# Patient Record
Sex: FEMALE | Race: BLACK OR AFRICAN AMERICAN | NOT HISPANIC OR LATINO | Employment: FULL TIME | URBAN - METROPOLITAN AREA
[De-identification: names, ages, dates, MRNs, and addresses within clinical notes are randomized per-mention and may not be internally consistent; named-entity substitution may affect disease eponyms.]

---
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
## 2019-01-03 ENCOUNTER — HOSPITAL ENCOUNTER (EMERGENCY)
Facility: HOSPITAL | Age: 19
Discharge: HOME | End: 2019-01-03
Attending: EMERGENCY MEDICINE
Payer: MEDICAID

## 2019-01-03 VITALS
DIASTOLIC BLOOD PRESSURE: 64 MMHG | TEMPERATURE: 98 F | SYSTOLIC BLOOD PRESSURE: 102 MMHG | OXYGEN SATURATION: 100 % | WEIGHT: 136 LBS | BODY MASS INDEX: 24.1 KG/M2 | HEART RATE: 99 BPM | HEIGHT: 63 IN | RESPIRATION RATE: 16 BRPM

## 2019-01-03 DIAGNOSIS — R07.9 CHEST PAIN: ICD-10-CM

## 2019-01-03 DIAGNOSIS — R51.9 NONINTRACTABLE HEADACHE, UNSPECIFIED CHRONICITY PATTERN, UNSPECIFIED HEADACHE TYPE: Primary | ICD-10-CM

## 2019-01-03 LAB
ALBUMIN SERPL BCP-MCNC: 4.7 G/DL
ALP SERPL-CCNC: 74 U/L
ALT SERPL W/O P-5'-P-CCNC: 13 U/L
ANION GAP SERPL CALC-SCNC: 10 MMOL/L
AST SERPL-CCNC: 19 U/L
BASOPHILS # BLD AUTO: 0.01 K/UL
BASOPHILS NFR BLD: 0.2 %
BILIRUB SERPL-MCNC: 1 MG/DL
BILIRUB UR QL STRIP: NEGATIVE
BUN SERPL-MCNC: 6 MG/DL
CALCIUM SERPL-MCNC: 10.2 MG/DL
CHLORIDE SERPL-SCNC: 107 MMOL/L
CLARITY UR: CLEAR
CO2 SERPL-SCNC: 24 MMOL/L
COLOR UR: YELLOW
CREAT SERPL-MCNC: 0.8 MG/DL
DIFFERENTIAL METHOD: ABNORMAL
EOSINOPHIL # BLD AUTO: 0.1 K/UL
EOSINOPHIL NFR BLD: 2.6 %
ERYTHROCYTE [DISTWIDTH] IN BLOOD BY AUTOMATED COUNT: 12.2 %
EST. GFR  (AFRICAN AMERICAN): >60 ML/MIN/1.73 M^2
EST. GFR  (NON AFRICAN AMERICAN): >60 ML/MIN/1.73 M^2
GLUCOSE SERPL-MCNC: 83 MG/DL
GLUCOSE UR QL STRIP: NEGATIVE
HCT VFR BLD AUTO: 39.9 %
HGB BLD-MCNC: 13.5 G/DL
HGB UR QL STRIP: NEGATIVE
KETONES UR QL STRIP: NEGATIVE
LEUKOCYTE ESTERASE UR QL STRIP: NEGATIVE
LYMPHOCYTES # BLD AUTO: 1.8 K/UL
LYMPHOCYTES NFR BLD: 41.7 %
MCH RBC QN AUTO: 29.2 PG
MCHC RBC AUTO-ENTMCNC: 33.8 G/DL
MCV RBC AUTO: 86 FL
MONOCYTES # BLD AUTO: 0.4 K/UL
MONOCYTES NFR BLD: 9.8 %
NEUTROPHILS # BLD AUTO: 2 K/UL
NEUTROPHILS NFR BLD: 45.7 %
NITRITE UR QL STRIP: NEGATIVE
PH UR STRIP: 6 [PH] (ref 5–8)
PLATELET # BLD AUTO: 351 K/UL
PMV BLD AUTO: 9.3 FL
POTASSIUM SERPL-SCNC: 3.7 MMOL/L
PROT SERPL-MCNC: 9.1 G/DL
PROT UR QL STRIP: NEGATIVE
RBC # BLD AUTO: 4.62 M/UL
SODIUM SERPL-SCNC: 141 MMOL/L
SP GR UR STRIP: <=1.005 (ref 1–1.03)
URN SPEC COLLECT METH UR: ABNORMAL
UROBILINOGEN UR STRIP-ACNC: NEGATIVE EU/DL
WBC # BLD AUTO: 4.29 K/UL

## 2019-01-03 PROCEDURE — 81003 URINALYSIS AUTO W/O SCOPE: CPT

## 2019-01-03 PROCEDURE — 80053 COMPREHEN METABOLIC PANEL: CPT

## 2019-01-03 PROCEDURE — 93010 ELECTROCARDIOGRAM REPORT: CPT | Mod: ,,, | Performed by: INTERNAL MEDICINE

## 2019-01-03 PROCEDURE — 63600175 PHARM REV CODE 636 W HCPCS: Performed by: EMERGENCY MEDICINE

## 2019-01-03 PROCEDURE — 99284 EMERGENCY DEPT VISIT MOD MDM: CPT

## 2019-01-03 PROCEDURE — 93005 ELECTROCARDIOGRAM TRACING: CPT

## 2019-01-03 PROCEDURE — 85025 COMPLETE CBC W/AUTO DIFF WBC: CPT

## 2019-01-03 PROCEDURE — 96374 THER/PROPH/DIAG INJ IV PUSH: CPT

## 2019-01-03 PROCEDURE — 93010 EKG 12-LEAD: ICD-10-PCS | Mod: ,,, | Performed by: INTERNAL MEDICINE

## 2019-01-03 RX ORDER — GUANFACINE 1 MG/1
1 TABLET ORAL NIGHTLY
COMMUNITY
End: 2024-01-31

## 2019-01-03 RX ORDER — IBUPROFEN 600 MG/1
600 TABLET ORAL EVERY 6 HOURS PRN
Qty: 20 TABLET | Refills: 0 | OUTPATIENT
Start: 2019-01-03 | End: 2019-08-17

## 2019-01-03 RX ORDER — FERROUS SULFATE 324(65)MG
325 TABLET, DELAYED RELEASE (ENTERIC COATED) ORAL DAILY
COMMUNITY
End: 2024-01-31

## 2019-01-03 RX ORDER — FLUTICASONE PROPIONATE 50 MCG
1 SPRAY, SUSPENSION (ML) NASAL DAILY
COMMUNITY
End: 2019-08-17

## 2019-01-03 RX ORDER — METOCLOPRAMIDE HYDROCHLORIDE 5 MG/ML
10 INJECTION INTRAMUSCULAR; INTRAVENOUS
Status: COMPLETED | OUTPATIENT
Start: 2019-01-03 | End: 2019-01-03

## 2019-01-03 RX ADMIN — METOCLOPRAMIDE 10 MG: 5 INJECTION, SOLUTION INTRAMUSCULAR; INTRAVENOUS at 09:01

## 2019-01-04 NOTE — ED PROVIDER NOTES
"Encounter Date: 1/3/2019    SCRIBE #1 NOTE: I, Sergio Kay, am scribing for, and in the presence of,  Dr. Rony Kelley. I have scribed the entire note.       History     Chief Complaint   Patient presents with    Headache     18y F ambulatory to ED with c/o headache and dizziness x3 days with intermittent nausea     Time seen by provider: 8:11 PM    This is a 18 y.o. female who presents with complaint of headache x 2 weeks, worse the past 3 days. She admits to chest pain, SOB, abd pain, blurry vision, general body aches, nausea, and subjective fever earlier this week but denies diarrhea, focal numbness/weakness, or dysuria. No history of prior similar episodes. Patient reports being seen by PCP 2 months ago for headache and had MRI performed at "[facility]". Patient was told she had "low blood pressure" but was not prescribed any medication. Patient's LMP ended 1 week ago. Patient reports history of anemia but has not had blood transfusion before.      The history is provided by the patient.     Review of patient's allergies indicates:   Allergen Reactions    Sulfa (sulfonamide antibiotics) Nausea And Vomiting     Past Medical History:   Diagnosis Date    ADHD     Asthma     Environmental allergies      History reviewed. No pertinent surgical history.  History reviewed. No pertinent family history.  Social History     Tobacco Use    Smoking status: Never Smoker    Smokeless tobacco: Never Used   Substance Use Topics    Alcohol use: No    Drug use: No     Review of Systems   Constitutional: Positive for fever (currently resolved).        + generalized body aches   Eyes: Positive for visual disturbance (blurry vision).   Respiratory: Positive for shortness of breath.    Cardiovascular: Positive for chest pain.   Gastrointestinal: Positive for abdominal pain and nausea. Negative for diarrhea.   Genitourinary: Negative for dysuria.   Neurological: Positive for headaches. Negative for weakness and numbness. "   All other systems reviewed and are negative.      Physical Exam     Initial Vitals [01/03/19 1940]   BP Pulse Resp Temp SpO2   102/64 99 16 97.7 °F (36.5 °C) 100 %      MAP       --         Physical Exam    Nursing note and vitals reviewed.  Constitutional: She appears well-developed and well-nourished. She is not diaphoretic. No distress.   HENT:   Head: Atraumatic.   Mouth/Throat: Oropharynx is clear and moist.   Eyes: EOM are normal. Pupils are equal, round, and reactive to light.   Neck: Normal range of motion. Neck supple.   Cardiovascular: Normal rate, regular rhythm, normal heart sounds and intact distal pulses.   Pulmonary/Chest: Breath sounds normal. No respiratory distress. She has no wheezes.   Abdominal: Soft. She exhibits no distension and no mass. There is no tenderness.   Musculoskeletal: Normal range of motion. She exhibits no edema.   Neurological: She is alert and oriented to person, place, and time. She has normal strength. No cranial nerve deficit or sensory deficit. GCS score is 15. GCS eye subscore is 4. GCS verbal subscore is 5. GCS motor subscore is 6.   Skin: Skin is warm and dry.         ED Course   Procedures  Labs Reviewed   CBC W/ AUTO DIFFERENTIAL - Abnormal; Notable for the following components:       Result Value    Platelets 351 (*)     All other components within normal limits   COMPREHENSIVE METABOLIC PANEL - Abnormal; Notable for the following components:    Total Protein 9.1 (*)     All other components within normal limits   URINALYSIS, REFLEX TO URINE CULTURE - Abnormal; Notable for the following components:    Specific Gravity, UA <=1.005 (*)     All other components within normal limits    Narrative:     Preferred Collection Type->Urine, Clean Catch   PREGNANCY TEST, URINE RAPID     EKG Readings: (Independently Interpreted)   EKG: Normal sinus rhythm. Rate: 84 bpm. Normal intervals. No ischemia.       Imaging Results    None          Medical Decision Making:   Initial  Assessment:   Patient has a headache and generalized body pain.  She appears well and has a normal exam.   Will give Reglan for her headache and obtain labs to exclude hypokalemia/anemia.  She reports recent MRI and I do not suspect SAH; no imaging currently indicated.  Clinical Tests:   Lab Tests: Ordered and Reviewed  Medical Tests: Ordered and Reviewed  ED Management:  10:45 PM   I reviewed her labs; they are unremarkable.  I will discharge her home for in clinic follow up.                      Clinical Impression:     1. Nonintractable headache, unspecified chronicity pattern, unspecified headache type    2. Chest pain            Disposition:   Disposition: Discharged  Condition: Stable       I, Dr. Adam Kelley, personally performed the services described in this documentation. All medical record entries made by the scribe were at my direction and in my presence.  I have reviewed the chart and agree that the record reflects my personal performance and is accurate and complete. Adam Kelley MD.                 Rony Kelley MD  01/04/19 6289

## 2019-01-04 NOTE — ED TRIAGE NOTES
"Patient reports headache x few months, has had MRI in past and states "they did not find anything." Also c/o decreased appetite and nausea with occasional vomiting. Pt states she has been dizzy today.  "

## 2019-01-29 ENCOUNTER — TELEPHONE (OUTPATIENT)
Dept: HEMATOLOGY/ONCOLOGY | Facility: CLINIC | Age: 19
End: 2019-01-29

## 2019-01-30 ENCOUNTER — OFFICE VISIT (OUTPATIENT)
Dept: HEMATOLOGY/ONCOLOGY | Facility: CLINIC | Age: 19
End: 2019-01-30
Payer: COMMERCIAL

## 2019-01-30 ENCOUNTER — LAB VISIT (OUTPATIENT)
Dept: LAB | Facility: HOSPITAL | Age: 19
End: 2019-01-30
Attending: INTERNAL MEDICINE
Payer: COMMERCIAL

## 2019-01-30 VITALS
HEART RATE: 72 BPM | TEMPERATURE: 98 F | SYSTOLIC BLOOD PRESSURE: 93 MMHG | DIASTOLIC BLOOD PRESSURE: 62 MMHG | RESPIRATION RATE: 18 BRPM | WEIGHT: 128.75 LBS | BODY MASS INDEX: 22.81 KG/M2

## 2019-01-30 DIAGNOSIS — Z86.2 HISTORY OF ANEMIA: ICD-10-CM

## 2019-01-30 DIAGNOSIS — D75.839 THROMBOCYTOSIS: ICD-10-CM

## 2019-01-30 DIAGNOSIS — I95.89 OTHER SPECIFIED HYPOTENSION: ICD-10-CM

## 2019-01-30 DIAGNOSIS — F90.9 ATTENTION DEFICIT HYPERACTIVITY DISORDER (ADHD), UNSPECIFIED ADHD TYPE: ICD-10-CM

## 2019-01-30 DIAGNOSIS — Z86.2 HISTORY OF ANEMIA: Primary | ICD-10-CM

## 2019-01-30 PROBLEM — I95.9 LOW BLOOD PRESSURE: Status: ACTIVE | Noted: 2019-01-30

## 2019-01-30 LAB
BASOPHILS # BLD AUTO: 0.01 K/UL
BASOPHILS NFR BLD: 0.3 %
DIFFERENTIAL METHOD: ABNORMAL
EOSINOPHIL # BLD AUTO: 0.1 K/UL
EOSINOPHIL NFR BLD: 2.1 %
ERYTHROCYTE [DISTWIDTH] IN BLOOD BY AUTOMATED COUNT: 12.3 %
FERRITIN SERPL-MCNC: 28 NG/ML
HCT VFR BLD AUTO: 38.5 %
HGB BLD-MCNC: 12.7 G/DL
IRON SERPL-MCNC: 85 UG/DL
LYMPHOCYTES # BLD AUTO: 1.6 K/UL
LYMPHOCYTES NFR BLD: 46.6 %
MCH RBC QN AUTO: 29.1 PG
MCHC RBC AUTO-ENTMCNC: 33 G/DL
MCV RBC AUTO: 88 FL
MONOCYTES # BLD AUTO: 0.4 K/UL
MONOCYTES NFR BLD: 11.8 %
NEUTROPHILS # BLD AUTO: 1.3 K/UL
NEUTROPHILS NFR BLD: 39.2 %
PLATELET # BLD AUTO: 336 K/UL
PMV BLD AUTO: 9.4 FL
RBC # BLD AUTO: 4.36 M/UL
SATURATED IRON: 20 %
TOTAL IRON BINDING CAPACITY: 426 UG/DL
TRANSFERRIN SERPL-MCNC: 288 MG/DL
WBC # BLD AUTO: 3.39 K/UL

## 2019-01-30 PROCEDURE — 36415 COLL VENOUS BLD VENIPUNCTURE: CPT

## 2019-01-30 PROCEDURE — 3008F BODY MASS INDEX DOCD: CPT | Mod: CPTII,S$GLB,, | Performed by: INTERNAL MEDICINE

## 2019-01-30 PROCEDURE — 85025 COMPLETE CBC W/AUTO DIFF WBC: CPT

## 2019-01-30 PROCEDURE — 83540 ASSAY OF IRON: CPT

## 2019-01-30 PROCEDURE — 3008F PR BODY MASS INDEX (BMI) DOCUMENTED: ICD-10-PCS | Mod: CPTII,S$GLB,, | Performed by: INTERNAL MEDICINE

## 2019-01-30 PROCEDURE — 82728 ASSAY OF FERRITIN: CPT

## 2019-01-30 PROCEDURE — 99204 PR OFFICE/OUTPT VISIT, NEW, LEVL IV, 45-59 MIN: ICD-10-PCS | Mod: S$GLB,,, | Performed by: INTERNAL MEDICINE

## 2019-01-30 PROCEDURE — 99999 PR PBB SHADOW E&M-EST. PATIENT-LVL III: CPT | Mod: PBBFAC,,, | Performed by: INTERNAL MEDICINE

## 2019-01-30 PROCEDURE — 99204 OFFICE O/P NEW MOD 45 MIN: CPT | Mod: S$GLB,,, | Performed by: INTERNAL MEDICINE

## 2019-01-30 PROCEDURE — 83020 HEMOGLOBIN ELECTROPHORESIS: CPT

## 2019-01-30 PROCEDURE — 99999 PR PBB SHADOW E&M-EST. PATIENT-LVL III: ICD-10-PCS | Mod: PBBFAC,,, | Performed by: INTERNAL MEDICINE

## 2019-01-30 NOTE — PROGRESS NOTES
"PATIENT: Jeremi Haney  MRN: [id number]  DATE: 1/30/2019    Diagnosis:   1. History of anemia    2. Thrombocytosis    3. Other specified hypotension    4. Attention deficit hyperactivity disorder (ADHD), unspecified ADHD type      Chief Complaint: history of anemia    Subjective:    History of Present Illness: Ms. Haney is a 18 y.o. female who presents for evaluation and management of a history of anemia and low blood anemia. She is self-referred.  - she has the following comorbid conditions: attention deficit hyperactivity disorder, a self-reported history of anemia and low-blood pressure. She believes she was told that she had iron deficiency. She takes an iron tablet twice daily. Regarding her low blood pressure, she states that she's had it for several years. She describes lightheadedness when she would change positions.    She states that her menstrual cycles are "not that heavy." They last 4-5 days, and maybe 1-2 days have moderate bleeding.     She presented to the emergency room on 1/3/19 with complaints of headache x 2 weeks. She had other symptoms, including "chest pain, SOB, abd pain, blurry vision, general body aches, nausea, and subjective fever earlier this week but denies diarrhea, focal numbness/weakness, or dysuria."     Today, she complains of headaches and lightheadedness upon changing positions. She denies shortness of breath, chest pain, nausea, vomiting, diarrhea, constipation.    Past medical, surgical, family, and social histories have been reviewed and updated below.    Past Medical History:   Past Medical History:   Diagnosis Date    ADHD     Asthma     Environmental allergies        Past Surgical History: No past surgical history on file.    Family History: No family history on file.    Social History:  reports that  has never smoked. she has never used smokeless tobacco. She reports that she does not drink alcohol or use drugs.    Allergies:  Review of patient's allergies indicates: "   Allergen Reactions    Sulfa (sulfonamide antibiotics) Nausea And Vomiting       Medications:  Current Outpatient Medications   Medication Sig Dispense Refill    CETIRIZINE HCL (ZYRTEC ORAL) Take by mouth.      ferrous sulfate 324 mg (65 mg iron) TbEC Take 325 mg by mouth once daily.      fluticasone (FLONASE) 50 mcg/actuation nasal spray 1 spray by Each Nare route once daily.      guanFACINE (TENEX) 1 MG Tab Take 1 mg by mouth every evening.      ibuprofen (ADVIL,MOTRIN) 600 MG tablet Take 1 tablet (600 mg total) by mouth every 6 (six) hours as needed for Pain. 20 tablet 0     No current facility-administered medications for this visit.        Review of Systems   Constitutional: Positive for fatigue.   HENT: Negative for sore throat.    Eyes: Negative for visual disturbance.   Respiratory: Negative for cough and shortness of breath.    Cardiovascular: Negative for chest pain.   Gastrointestinal: Negative for abdominal pain.   Genitourinary: Negative for dysuria.   Musculoskeletal: Negative for back pain.   Skin: Negative for rash.   Neurological: Positive for headaches.   Hematological: Negative for adenopathy.   Psychiatric/Behavioral: The patient is not nervous/anxious.      ECOG Performance Status:   ECOG SCORE 0     Objective:      Vitals:   Vitals:    01/30/19 1314   BP: 93/62   Pulse: 72   Resp: 18   Temp: 98.2 °F (36.8 °C)   TempSrc: Oral   Weight: 58.4 kg (128 lb 12 oz)     BMI: Body mass index is 22.81 kg/m².    Physical Exam   Constitutional: She is oriented to person, place, and time. She appears well-developed and well-nourished.   HENT:   Head: Normocephalic and atraumatic.   Eyes: EOM are normal. Pupils are equal, round, and reactive to light.   Neck: Normal range of motion. Neck supple.   Cardiovascular: Normal rate and regular rhythm.   Pulmonary/Chest: Effort normal and breath sounds normal. She has no rales.   Abdominal: Soft. Bowel sounds are normal.   Musculoskeletal: Normal range of  motion. She exhibits no edema.   Neurological: She is alert and oriented to person, place, and time.   Skin: Skin is warm and dry.   Psychiatric: She has a normal mood and affect. Her behavior is normal. Judgment and thought content normal.   Nursing note and vitals reviewed.    Laboratory Data:  Labs have been reviewed.    Lab Results   Component Value Date    WBC 3.39 (L) 01/30/2019    HGB 12.7 01/30/2019    HCT 38.5 01/30/2019    MCV 88 01/30/2019     01/30/2019     Assessment:       1. History of anemia    2. Thrombocytosis    3. Other specified hypotension    4. Attention deficit hyperactivity disorder (ADHD), unspecified ADHD type         Plan:     1. History of anemia / thrombocytosis  - lab from 1/3/19 revealed borderline thrombocytosis at 351 k/uL. She believes she has a history of iron deficiency anemia and possibly sickle cell trait.  - I have ordered the following labs: CBC, iron/TIBC, ferritin, hemoglobin electrophoresis  - platelet count today is normal at 336 k/uL.   - I will call her with the results of her iron studies and hemoglobin electrophoresis. If needed, I will schedule a follow-up appointment.    2. Hypotension / headaches / probable orthostatic hypotension  - I am concerned that one of the medications she takes for ADHD, guanfacine, is contributing to her symptoms. It is an alpha-2 adrenoreceptor agonist that decreases vasomotor tone and heart rate. This can lead to poor compensation by the vasculature when changing position.  - I asked her to speak to her primary care physician or possibly a psychiatrist about changing medications.    - I will call her with the results of her iron studies and hemoglobin electrophoresis. If needed, I will schedule a follow-up appointment.    Robert Jordan M.D.  Hematology/Oncology  [facility] [address], [address]  [place] [address]  Phone: [phone]  Fax: [phone]

## 2019-02-04 ENCOUNTER — TELEPHONE (OUTPATIENT)
Dept: HEMATOLOGY/ONCOLOGY | Facility: CLINIC | Age: 19
End: 2019-02-04

## 2019-02-04 LAB
HGB A MFR BLD ELPH: 97.4 % (ref 95.8–98)
HGB A2 MFR BLD: 2.6 % (ref 2–3.3)
HGB F MFR BLD: 0 % (ref 0–0.9)
HGB FRACT BLD ELPH-IMP: NORMAL
HGB OTHER MFR BLD ELPH: 0 %
THEVP VARIANT 2: NORMAL
THEVP VARIANT 3: NORMAL

## 2019-02-06 ENCOUNTER — TELEPHONE (OUTPATIENT)
Dept: HEMATOLOGY/ONCOLOGY | Facility: CLINIC | Age: 19
End: 2019-02-06

## 2019-02-06 NOTE — TELEPHONE ENCOUNTER
I called and gave Ms. Haney the results of her labs. Her hemoglobin electrophoresis was negative for sickle cell trait. She does not have a hemoglobinopathy such as sickle cell disease or beta thalassemia. Her platelet count was normal. Her iron studies were normal but are low normal, suggestive of borderline iron deficiency. I recommended that she continue taking oral iron.    Patient is in agreement with the proposed treatment plan. All questions were answered to her satisfaction.    Robert Jordan M.D.  Hematology/Oncology  [facility] [address], [address]  [place] [address]  Phone: [phone]  Fax: [phone]

## 2019-08-17 ENCOUNTER — HOSPITAL ENCOUNTER (EMERGENCY)
Facility: HOSPITAL | Age: 19
Discharge: HOME | End: 2019-08-17
Attending: EMERGENCY MEDICINE
Payer: COMMERCIAL

## 2019-08-17 VITALS
RESPIRATION RATE: 20 BRPM | BODY MASS INDEX: 23.03 KG/M2 | OXYGEN SATURATION: 98 % | SYSTOLIC BLOOD PRESSURE: 109 MMHG | HEART RATE: 112 BPM | TEMPERATURE: 100 F | DIASTOLIC BLOOD PRESSURE: 61 MMHG | WEIGHT: 130 LBS

## 2019-08-17 DIAGNOSIS — S29.019A STRAIN OF MUSCLE OF TORSO, INITIAL ENCOUNTER: ICD-10-CM

## 2019-08-17 DIAGNOSIS — J30.2 SEASONAL ALLERGIC RHINITIS, UNSPECIFIED TRIGGER: ICD-10-CM

## 2019-08-17 DIAGNOSIS — N30.01 ACUTE CYSTITIS WITH HEMATURIA: Primary | ICD-10-CM

## 2019-08-17 LAB
B-HCG UR QL: NEGATIVE
BACTERIA #/AREA URNS HPF: ABNORMAL /HPF
BILIRUB UR QL STRIP: NEGATIVE
CLARITY UR: ABNORMAL
COLOR UR: YELLOW
CTP QC/QA: YES
GLUCOSE UR QL STRIP: NEGATIVE
HGB UR QL STRIP: ABNORMAL
KETONES UR QL STRIP: ABNORMAL
LEUKOCYTE ESTERASE UR QL STRIP: ABNORMAL
MICROSCOPIC COMMENT: ABNORMAL
NITRITE UR QL STRIP: POSITIVE
NON-SQ EPI CELLS #/AREA URNS HPF: 2 /HPF
PH UR STRIP: 6 [PH] (ref 5–8)
PROT UR QL STRIP: ABNORMAL
RBC #/AREA URNS HPF: 2 /HPF (ref 0–4)
SP GR UR STRIP: 1.02 (ref 1–1.03)
SQUAMOUS #/AREA URNS HPF: 2 /HPF
URN SPEC COLLECT METH UR: ABNORMAL
UROBILINOGEN UR STRIP-ACNC: NEGATIVE EU/DL
WBC #/AREA URNS HPF: 40 /HPF (ref 0–5)

## 2019-08-17 PROCEDURE — 81000 URINALYSIS NONAUTO W/SCOPE: CPT

## 2019-08-17 PROCEDURE — 87186 SC STD MICRODIL/AGAR DIL: CPT

## 2019-08-17 PROCEDURE — 87077 CULTURE AEROBIC IDENTIFY: CPT

## 2019-08-17 PROCEDURE — 87088 URINE BACTERIA CULTURE: CPT

## 2019-08-17 PROCEDURE — 87086 URINE CULTURE/COLONY COUNT: CPT

## 2019-08-17 PROCEDURE — 81025 URINE PREGNANCY TEST: CPT | Performed by: NURSE PRACTITIONER

## 2019-08-17 PROCEDURE — 25000003 PHARM REV CODE 250: Performed by: NURSE PRACTITIONER

## 2019-08-17 PROCEDURE — 99284 EMERGENCY DEPT VISIT MOD MDM: CPT | Mod: 25

## 2019-08-17 PROCEDURE — 63600175 PHARM REV CODE 636 W HCPCS: Performed by: NURSE PRACTITIONER

## 2019-08-17 PROCEDURE — 96372 THER/PROPH/DIAG INJ SC/IM: CPT

## 2019-08-17 RX ORDER — IBUPROFEN 600 MG/1
600 TABLET ORAL EVERY 6 HOURS PRN
Qty: 20 TABLET | Refills: 0 | Status: SHIPPED | OUTPATIENT
Start: 2019-08-17 | End: 2019-08-22

## 2019-08-17 RX ORDER — PHENAZOPYRIDINE HYDROCHLORIDE 200 MG/1
200 TABLET, FILM COATED ORAL 3 TIMES DAILY
Qty: 15 TABLET | Refills: 0 | Status: SHIPPED | OUTPATIENT
Start: 2019-08-17 | End: 2019-08-22

## 2019-08-17 RX ORDER — CETIRIZINE HYDROCHLORIDE 10 MG/1
10 TABLET ORAL DAILY
Qty: 30 TABLET | Refills: 11 | Status: SHIPPED | OUTPATIENT
Start: 2019-08-17 | End: 2020-01-21 | Stop reason: CLARIF

## 2019-08-17 RX ORDER — METHOCARBAMOL 500 MG/1
500 TABLET, FILM COATED ORAL 3 TIMES DAILY PRN
Qty: 15 TABLET | Refills: 0 | Status: SHIPPED | OUTPATIENT
Start: 2019-08-17 | End: 2019-08-22

## 2019-08-17 RX ORDER — CEPHALEXIN 500 MG/1
500 CAPSULE ORAL EVERY 12 HOURS
Qty: 10 CAPSULE | Refills: 0 | Status: SHIPPED | OUTPATIENT
Start: 2019-08-17 | End: 2019-08-22

## 2019-08-17 RX ORDER — KETOROLAC TROMETHAMINE 30 MG/ML
15 INJECTION, SOLUTION INTRAMUSCULAR; INTRAVENOUS
Status: COMPLETED | OUTPATIENT
Start: 2019-08-17 | End: 2019-08-17

## 2019-08-17 RX ORDER — DIAZEPAM 10 MG/2ML
5 INJECTION INTRAMUSCULAR
Status: COMPLETED | OUTPATIENT
Start: 2019-08-17 | End: 2019-08-17

## 2019-08-17 RX ORDER — FLUTICASONE PROPIONATE 50 MCG
1 SPRAY, SUSPENSION (ML) NASAL 2 TIMES DAILY PRN
Qty: 15 G | Refills: 0 | Status: SHIPPED | OUTPATIENT
Start: 2019-08-17 | End: 2020-01-21 | Stop reason: CLARIF

## 2019-08-17 RX ORDER — CEPHALEXIN 500 MG/1
500 CAPSULE ORAL
Status: COMPLETED | OUTPATIENT
Start: 2019-08-17 | End: 2019-08-17

## 2019-08-17 RX ADMIN — DIAZEPAM 5 MG: 5 INJECTION, SOLUTION INTRAMUSCULAR; INTRAVENOUS at 11:08

## 2019-08-17 RX ADMIN — KETOROLAC TROMETHAMINE 15 MG: 30 INJECTION, SOLUTION INTRAMUSCULAR at 11:08

## 2019-08-17 RX ADMIN — CEPHALEXIN 500 MG: 500 CAPSULE ORAL at 11:08

## 2019-08-17 NOTE — ED PROVIDER NOTES
Encounter Date: 8/17/2019       History     Chief Complaint   Patient presents with    Back Pain     reports lower back pain that radiates to upper back that radiates upward. also c/ of right leg pain that started thrusday as well. denies injury. last dose of ibuprofen was last night at 2300. denies numbness/ tingling.     Nasal Congestion     reports nasal congestion since thursday     Pt is a 20 yo female with medical history of asthma, ADHD, allergies presenting to the ED for back pain and nasal congestion.  Pt states that she saw a chiropractor last week who completed x-rays.  Patient states she was told she had muscle strain of her right side.  Pt states she has not taken anything for the pain.  Pt also states she has nasal congestion and sinus pressure.  Pt states she is out of her Flonase and Zyrtec.  Pt denies any trauma or falls.  Pt denies any chest pain, SOB, dysuria.        The history is provided by the patient and a relative.     Review of patient's allergies indicates:   Allergen Reactions    Sulfa (sulfonamide antibiotics) Nausea And Vomiting     Past Medical History:   Diagnosis Date    ADHD     Asthma     Environmental allergies      No past surgical history on file.  No family history on file.  Social History     Tobacco Use    Smoking status: Never Smoker    Smokeless tobacco: Never Used   Substance Use Topics    Alcohol use: No    Drug use: No     Review of Systems   Constitutional: Negative for activity change, appetite change, chills, fatigue and fever.   HENT: Positive for congestion, postnasal drip, sinus pressure and sinus pain. Negative for facial swelling, sore throat and trouble swallowing.    Respiratory: Negative for cough, chest tightness, shortness of breath and wheezing.    Cardiovascular: Negative for chest pain and palpitations.   Gastrointestinal: Negative for abdominal pain, constipation, diarrhea, nausea and vomiting.   Genitourinary: Positive for flank pain ( right).  Negative for decreased urine volume, difficulty urinating, dysuria, hematuria, pelvic pain, urgency, vaginal bleeding and vaginal discharge.   Musculoskeletal: Positive for back pain. Negative for arthralgias, joint swelling and myalgias.   Skin: Negative for color change, pallor, rash and wound.   Neurological: Negative for dizziness, syncope, weakness, numbness and headaches.   Hematological: Does not bruise/bleed easily.   All other systems reviewed and are negative.      Physical Exam     Initial Vitals [08/17/19 1051]   BP Pulse Resp Temp SpO2   (!) 100/59 (!) 137 20 (!) 100.4 °F (38 °C) 98 %      MAP       --         Physical Exam    Nursing note and vitals reviewed.  Constitutional: Vital signs are normal. She appears well-developed and well-nourished. She is cooperative. She does not have a sickly appearance. She does not appear ill. She appears distressed ( due to pain ).   HENT:   Head: Normocephalic and atraumatic.   Nose: Rhinorrhea present. No sinus tenderness. Right sinus exhibits maxillary sinus tenderness and frontal sinus tenderness. Left sinus exhibits maxillary sinus tenderness and frontal sinus tenderness.   Mouth/Throat: Uvula is midline, oropharynx is clear and moist and mucous membranes are normal.   Eyes: EOM and lids are normal. Pupils are equal, round, and reactive to light.   Neck: Trachea normal, normal range of motion, full passive range of motion without pain and phonation normal. Neck supple.   Cardiovascular: Normal rate, regular rhythm and intact distal pulses.   Pulses:       Radial pulses are 2+ on the right side, and 2+ on the left side.        Dorsalis pedis pulses are 2+ on the left side.   Pulmonary/Chest: Effort normal and breath sounds normal. She has no wheezes. She has no rhonchi.   Abdominal: Soft. Normal appearance and bowel sounds are normal. She exhibits no distension. There is no tenderness. There is no rigidity, no rebound and no guarding.   Musculoskeletal:         Lumbar back: She exhibits tenderness, pain and spasm. She exhibits normal range of motion, no bony tenderness, no swelling, no edema, no deformity, no laceration and normal pulse.        Back:    No C, T, L spine TTP.  No decreased sensation or strength to extremities.     Lymphadenopathy:     She has no cervical adenopathy.   Neurological: She is alert and oriented to person, place, and time. She has normal strength. No sensory deficit. She displays a negative Romberg sign. GCS eye subscore is 4. GCS verbal subscore is 5. GCS motor subscore is 6.   Skin: Skin is warm, dry and intact. Capillary refill takes 2 to 3 seconds. No abrasion, no laceration and no rash noted. No pallor.   Psychiatric: She has a normal mood and affect.         ED Course   Procedures  Labs Reviewed   URINALYSIS, REFLEX TO URINE CULTURE - Abnormal; Notable for the following components:       Result Value    Appearance, UA Cloudy (*)     Protein, UA Trace (*)     Ketones, UA 3+ (*)     Occult Blood UA Trace (*)     Nitrite, UA Positive (*)     Leukocytes, UA 2+ (*)     All other components within normal limits    Narrative:     Preferred Collection Type->Urine, Clean Catch   URINALYSIS MICROSCOPIC - Abnormal; Notable for the following components:    WBC, UA 40 (*)     Bacteria Many (*)     Non-Squam Epith 2 (*)     All other components within normal limits    Narrative:     Preferred Collection Type->Urine, Clean Catch   CULTURE, URINE   POCT URINE PREGNANCY          Imaging Results    None          Medical Decision Making:   Initial Assessment:   Emergent evaluation of a 18 yo female patient presenting to the ER with chief complaint of right sided low back pain and nasal congestion for the past week.  Patient states she was seen by a chiropractor on Thursday and had imaging completed.  Patient states she has had continued pain since that time.  Patient denies taking anything for her pain. Patient also states she is out of her Flonase and  Zyrtec.  On exam patient is A&O x3.  Not febrile and nontoxic appearing.  Tenderness to palpation over frontal and maxillary sinus.  Rhinorrhea noted. Oropharynx pink and moist.  Tenderness to palpation over right-sided internal oblique.  Increased pain with movement.  Abdomen soft and nontender.  Bowel sounds within normal limits. Patient denies any vaginal bleeding or vaginal discharge.      Differential Diagnosis:   Differential diagnoses include but are not limited to lumbar strain/sprain, renal colic, pyelonephritis, early shingles or muscle spasm/strain.    Clinical Tests:   Lab Tests: Ordered and Reviewed  The following lab test(s) were unremarkable: UPT and Urinalysis       <> Summary of Lab: Urine preg negative. UA shows trace blood with positive nitrite and 2+ leukocytes.  Will treat for UTI.  ED Management:  I will get urine, medicate and reassess.  Reviewed pt X-ray from Thursday.  No acute fractures or dislocations noted.  No acute abnormalities seen.      Patient reassessed.  Patient updated on UTI.  Advised patient we will prescribe her muscle relaxer for muscle strain, antibiotics for UTI, peridium for pain and Flonase/Zyrtec for seasonal allergies.  Patient advised to follow up with her PCP if symptoms are not resolving in the next 7-10 days.  Patient verbalized understanding of this plan of care.  All questions and concerns addressed.    Patient is hemodynamically stable, vital signs are normal. Discharge instructions given. Return to ED precautions discussed. Follow up as directed. Pt verbalized understanding of this plan.  Pt is stable for discharge.                         Clinical Impression:       ICD-10-CM ICD-9-CM   1. Acute cystitis with hematuria N30.01 595.0   2. Strain of muscle of torso, initial encounter S29.019A 848.8   3. Seasonal allergic rhinitis, unspecified trigger J30.2 477.9         Disposition:   Disposition: Discharged  Condition: Stable                        Matilda SALVADOR  [place], [place]  08/17/19 1153

## 2019-08-17 NOTE — ED NOTES
Pt here c/o right sided back pain that radiates upward-has seen chiropractor and gotten xrays. Right leg has involvement as well. Pt is AAOx4, resp are regular and unlabored. Denies urinary or bowel changes. Mother at side. Denies abd pain or discomfort.

## 2019-08-17 NOTE — DISCHARGE INSTRUCTIONS
We have prescribed you allergy medication for your allergic rhinitis.  Antibiotics and pain medication for your UTI.  We have also prescribed you a muscle relaxer for your muscle strain.  Please follow-up with your PCP if your symptoms do not start improving in the next 5-7 days.    Our goal in the emergency department is to always give you outstanding care and exceptional service. You may receive a survey by mail or e-mail in the next week regarding your experience in our ED. We would greatly appreciate your completing and returning the survey. Your feedback provides us with a way to recognize our staff who give very good care and it helps us learn how to improve when your experience was below our aspiration of excellence.

## 2019-08-19 LAB — BACTERIA UR CULT: ABNORMAL

## 2020-01-21 ENCOUNTER — HOSPITAL ENCOUNTER (EMERGENCY)
Facility: HOSPITAL | Age: 20
Discharge: HOME | End: 2020-01-21
Attending: EMERGENCY MEDICINE
Payer: COMMERCIAL

## 2020-01-21 VITALS
DIASTOLIC BLOOD PRESSURE: 67 MMHG | BODY MASS INDEX: 22.5 KG/M2 | HEIGHT: 63 IN | RESPIRATION RATE: 16 BRPM | TEMPERATURE: 99 F | WEIGHT: 127 LBS | SYSTOLIC BLOOD PRESSURE: 129 MMHG | HEART RATE: 99 BPM | OXYGEN SATURATION: 98 %

## 2020-01-21 DIAGNOSIS — J06.9 VIRAL URI WITH COUGH: Primary | ICD-10-CM

## 2020-01-21 LAB
B-HCG UR QL: NEGATIVE
CTP QC/QA: YES
DEPRECATED S PYO AG THROAT QL EIA: NEGATIVE
INFLUENZA A, MOLECULAR: NEGATIVE
INFLUENZA B, MOLECULAR: NEGATIVE
SPECIMEN SOURCE: NORMAL

## 2020-01-21 PROCEDURE — 87880 STREP A ASSAY W/OPTIC: CPT

## 2020-01-21 PROCEDURE — 25000003 PHARM REV CODE 250: Performed by: PHYSICIAN ASSISTANT

## 2020-01-21 PROCEDURE — 87081 CULTURE SCREEN ONLY: CPT

## 2020-01-21 PROCEDURE — 87502 INFLUENZA DNA AMP PROBE: CPT

## 2020-01-21 PROCEDURE — 81025 URINE PREGNANCY TEST: CPT | Performed by: PHYSICIAN ASSISTANT

## 2020-01-21 PROCEDURE — 99284 EMERGENCY DEPT VISIT MOD MDM: CPT

## 2020-01-21 RX ORDER — GUAIFENESIN AND DEXTROMETHORPHAN HYDROBROMIDE 1200; 60 MG/1; MG/1
1 TABLET, EXTENDED RELEASE ORAL 2 TIMES DAILY PRN
Qty: 30 TABLET | Refills: 0 | Status: SHIPPED | OUTPATIENT
Start: 2020-01-21 | End: 2020-01-31

## 2020-01-21 RX ORDER — IBUPROFEN 600 MG/1
600 TABLET ORAL EVERY 6 HOURS PRN
Qty: 20 TABLET | Refills: 0 | Status: SHIPPED | OUTPATIENT
Start: 2020-01-21 | End: 2024-01-31

## 2020-01-21 RX ORDER — CETIRIZINE HYDROCHLORIDE 10 MG/1
10 TABLET ORAL DAILY
Qty: 30 TABLET | Refills: 0 | Status: SHIPPED | OUTPATIENT
Start: 2020-01-21 | End: 2024-01-31

## 2020-01-21 RX ORDER — FLUTICASONE PROPIONATE 50 MCG
1 SPRAY, SUSPENSION (ML) NASAL 2 TIMES DAILY PRN
Qty: 15 G | Refills: 0 | Status: SHIPPED | OUTPATIENT
Start: 2020-01-21 | End: 2024-01-31

## 2020-01-21 RX ORDER — IBUPROFEN 600 MG/1
600 TABLET ORAL
Status: COMPLETED | OUTPATIENT
Start: 2020-01-21 | End: 2020-01-21

## 2020-01-21 RX ADMIN — IBUPROFEN 600 MG: 600 TABLET, FILM COATED ORAL at 08:01

## 2020-01-21 NOTE — ED PROVIDER NOTES
Encounter Date: 1/21/2020       History     Chief Complaint   Patient presents with    Cough     pt began having ear pain, sore throat, and cough on Sunday. 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 afebrile female with PMH of allergies, ADHD and asthma presented to the ED with c/o URI symptoms for the past 3 days.  She complains of bilateral ear pain, sore throat, congestion and cough.  She states cough is dry. She states symptoms have been constant since onset.  She states that she did try congestion medicine yesterday with no significant improvement.  She has not tried any medications today.  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  Patient denies any fever, chest pain, shortness of breath, nausea, vomiting, diarrhea or rash. She states that she is typically well.    The history is provided by the patient.     Review of patient's allergies indicates:   Allergen Reactions    Sulfa (sulfonamide antibiotics) Nausea And Vomiting     Past Medical History:   Diagnosis Date    ADHD     Asthma     Environmental allergies      History reviewed. No pertinent surgical history.  History reviewed. No pertinent family history.  Social History     Tobacco Use    Smoking status: Never Smoker    Smokeless tobacco: Never Used   Substance Use Topics    Alcohol use: No    Drug use: No     Review of Systems   Constitutional: Positive for chills and fever (Subjective).   HENT: Positive for congestion, postnasal drip, rhinorrhea and sore throat.    Respiratory: Positive for cough. Negative for shortness of breath.    Cardiovascular: Negative for chest pain.   Gastrointestinal: Negative for nausea and vomiting.   Musculoskeletal: Positive for arthralgias. Negative for back pain, myalgias and neck pain.   Skin: Negative for rash.   Neurological: Negative for dizziness, weakness, light-headedness and headaches.    Hematological: Does not bruise/bleed easily.       Physical Exam     Initial Vitals [01/21/20 0749]   BP Pulse Resp Temp SpO2   129/67 99 16 98.5 °F (36.9 °C) 98 %      MAP       --         Physical Exam    Nursing note and vitals reviewed.  Constitutional: Vital signs are normal. She appears well-developed and well-nourished. She is cooperative.  Non-toxic appearance. She does not appear ill. No distress.   HENT:   Head: Normocephalic and atraumatic.   Nose: Mucosal edema present.   Mouth/Throat: Mucous membranes are not dry. Posterior oropharyngeal erythema present. No oropharyngeal exudate or posterior oropharyngeal edema.   Eyes: Conjunctivae and lids are normal.   Neck: Neck supple. No neck rigidity.   Cardiovascular: Normal rate and regular rhythm.   Pulmonary/Chest: Breath sounds normal. No respiratory distress. She has no wheezes. She has no rhonchi.   Abdominal: Soft. Normal appearance. There is no tenderness. There is no rigidity.   Musculoskeletal: Normal range of motion.   Neurological: She is alert and oriented to person, place, and time. GCS eye subscore is 4. GCS verbal subscore is 5. GCS motor subscore is 6.   Skin: Skin is warm, dry and intact. No rash noted.   Psychiatric: She has a normal mood and affect. Her speech is normal and behavior is normal. Thought content normal.         ED Course   Procedures  Labs Reviewed   INFLUENZA A & B BY MOLECULAR   THROAT SCREEN, RAPID   CULTURE, STREP A,  THROAT   POCT URINE PREGNANCY          Imaging Results    None     Jeremi Haney 19 y.o. afebrile female with PMH of allergies, ADHD and asthma presented to the ED with c/o URI symptoms for the past 3 days.  She complains of bilateral ear pain, sore throat, congestion and cough.  She states cough is dry. She states symptoms have been constant since onset.  She states that she did try congestion medicine yesterday with no significant improvement.  She has not tried any medications today.  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  Patient denies any fever, chest pain, shortness of breath, nausea, vomiting, diarrhea or rash. She states that she is typically well. ROS positive for URI symptoms.  Physical exam reveals patient that appears ill but nontoxic. TM's with bilateral serous otitis media; nose with congestion and rhinorrhea. Oropharynx with mild erythema; no edema or exudate. Lungs clear and free of wheeze. Heart regular rate and rhythm. Abdomen is soft and nontender with normal bowel sounds. FROM of neck, no lymphadenopathy and FROM of all extremities with strength 5/5 bilaterally. Skin free of rash, pallor and diaphoresis.    DDX: influenza, viral URI with cough, pharyngitis     ED management: Flu and rapid strep swab negative. No further labs or imaging warranted at this time as patient remains well appearing, afebrile and in no distress at this time.  We will send home with supportive medications for probable viral URI in this otherwise well patient and informed patient that this process is typically self limiting. Instructed patient on fever and symptom control.      Impression/Plan: The encounter diagnosis was Viral URI with cough. Discharged with Zyrtec, Mucinex DM, Flonase and Motrin. Patient will follow up with Primary.  Patient cautioned on when to return to ED.  Pt. Understands and agrees with current treatment plan                                                            Clinical Impression:       ICD-10-CM ICD-9-CM   1. Viral URI with cough J06.9 465.9    B97.89                              ABY Fuchs  01/21/20 1038

## 2020-01-21 NOTE — ED NOTES
Ariana at bedside for assessment. Patient stated that she has generalized body aches, runny nose, cough and congestion x2 days. 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 Patient stated that her throat is sore, no complaints of swallowing difficulty. Clear drainage noted from bilateral nares. Swabs collected     APPEARANCE: Alert, oriented and in no acute distress.  CARDIAC: Normal rate and rhythm, no murmur heard.   PERIPHERAL VASCULAR: peripheral pulses present. Normal cap refill. No edema. Warm to touch.    RESPIRATORY:Normal rate and effort, breath sounds clear bilaterally throughout chest. Respirations are equal and unlabored no obvious signs of distress.  GASTRO: soft, bowel sounds normal, no tenderness, no abdominal distention.  MUSC: Full ROM. No bony tenderness or soft tissue tenderness. No obvious deformity.  SKIN: Skin is warm and dry, normal skin turgor, mucous membranes moist.  NEURO: 5/5 strength major flexors/extensors bilaterally. Sensory intact to light touch bilaterally. Lucretia coma scale: eyes open spontaneously-4, oriented & converses-5, obeys commands-6. No neurological abnormalities.   MENTAL STATUS: awake, alert and aware of environment.

## 2020-01-23 LAB — BACTERIA THROAT CULT: NORMAL

## 2020-07-11 ENCOUNTER — HOSPITAL ENCOUNTER (EMERGENCY)
Facility: HOSPITAL | Age: 20
Discharge: HOME | End: 2020-07-11
Attending: EMERGENCY MEDICINE
Payer: COMMERCIAL

## 2020-07-11 ENCOUNTER — NURSE TRIAGE (OUTPATIENT)
Dept: ADMINISTRATIVE | Facility: CLINIC | Age: 20
End: 2020-07-11

## 2020-07-11 VITALS
SYSTOLIC BLOOD PRESSURE: 98 MMHG | HEART RATE: 100 BPM | BODY MASS INDEX: 22.15 KG/M2 | HEIGHT: 63 IN | TEMPERATURE: 99 F | WEIGHT: 125 LBS | OXYGEN SATURATION: 98 % | DIASTOLIC BLOOD PRESSURE: 65 MMHG | RESPIRATION RATE: 18 BRPM

## 2020-07-11 DIAGNOSIS — Z20.822 SUSPECTED COVID-19 VIRUS INFECTION: Primary | ICD-10-CM

## 2020-07-11 LAB — GROUP A STREP, MOLECULAR: NEGATIVE

## 2020-07-11 PROCEDURE — 99284 EMERGENCY DEPT VISIT MOD MDM: CPT

## 2020-07-11 PROCEDURE — U0003 INFECTIOUS AGENT DETECTION BY NUCLEIC ACID (DNA OR RNA); SEVERE ACUTE RESPIRATORY SYNDROME CORONAVIRUS 2 (SARS-COV-2) (CORONAVIRUS DISEASE [COVID-19]), AMPLIFIED PROBE TECHNIQUE, MAKING USE OF HIGH THROUGHPUT TECHNOLOGIES AS DESCRIBED BY CMS-2020-01-R: HCPCS

## 2020-07-11 PROCEDURE — 87651 STREP A DNA AMP PROBE: CPT

## 2020-07-11 RX ORDER — BENZONATATE 100 MG/1
100 CAPSULE ORAL 3 TIMES DAILY PRN
Qty: 30 CAPSULE | Refills: 0 | Status: SHIPPED | OUTPATIENT
Start: 2020-07-11 | End: 2020-07-21

## 2020-07-11 RX ORDER — ALBUTEROL SULFATE 90 UG/1
1-2 AEROSOL, METERED RESPIRATORY (INHALATION) EVERY 6 HOURS PRN
Qty: 6.7 G | Refills: 0 | Status: SHIPPED | OUTPATIENT
Start: 2020-07-11 | End: 2024-01-31

## 2020-07-11 RX ORDER — ACETAMINOPHEN 500 MG/1
1000 CAPSULE, LIQUID FILLED ORAL EVERY 8 HOURS PRN
Qty: 30 CAPSULE | Refills: 0 | Status: SHIPPED | OUTPATIENT
Start: 2020-07-11 | End: 2024-01-31

## 2020-07-11 RX ORDER — GUAIFENESIN 100 MG/5ML
100-200 SOLUTION ORAL EVERY 4 HOURS PRN
Qty: 60 ML | Refills: 0 | Status: SHIPPED | OUTPATIENT
Start: 2020-07-11 | End: 2020-07-21

## 2020-07-11 NOTE — Clinical Note
"Jeremi "Jonny Haney was seen and treated in our emergency department on 7/11/2020.     COVID-19 is present in our communities across the state. There is limited testing for COVID at this time, so not all patients can be tested. In this situation, your employee meets the following criteria:    Jeremi Haney has met the criteria for COVID-19 testing based upon symptoms, travel, and/or potential exposure. The test has been completed and is pending results at this time. During this time the employee is not able to work and should be quarantined per the Centers for Disease Control timelines.     If you have any questions or concerns, or if I can be of further assistance, please do not hesitate to contact me.    Sincerely,             Mica Rodriguez NP"

## 2020-07-11 NOTE — DISCHARGE INSTRUCTIONS
Thank you for allowing me to care for you today.  I hope our treatment plan will make you feel better in the next few days.  In order for me to take better care of my future patients and improve our Emergency Department, I would appreciate if you can provide us with feedback.  In the next few days, you may receive a survey in the mail.  If you do, it would mean a great deal to me if you would please take the time to complete it.    Thank you and I hope you feel better.  Mica Rodriguez NP

## 2020-07-11 NOTE — TELEPHONE ENCOUNTER
Reason for Disposition   General information question, no triage required and triager able to answer question    Protocols used: INFORMATION ONLY CALL-A-AH    Pt requesting information to COVID testing locations. Pt requesting information to COVID testing locations. Pt advised all [facility] can test for COVID, but the Provider at the site determines if a test will be administered. Pt also advised of the [facility] testing sites. Advised to call  back if sob or difficulty breathing develops. Pt verbalized understanding.

## 2020-07-12 NOTE — ED PROVIDER NOTES
Encounter Date: 7/11/2020       History     Chief Complaint   Patient presents with    COVID-19 Concerns     c/o headache x2 days and sore throat today. denies known contact with anyone with covid-19     The patient is a 19-year-old female with a past medical history of ADHD, asthma, and environmental allergies who presents to the ED complaining of headache for the past 2 days and sore throat that began today.  No fevers.  No known coronavirus contacts.  She is tolerating oral intake.  No change in elimination patterns.  No treatment attempted prior to arrival.    The history is provided by the patient.     Review of patient's allergies indicates:   Allergen Reactions    Sulfa (sulfonamide antibiotics) Nausea And Vomiting     Past Medical History:   Diagnosis Date    ADHD     Asthma     Environmental allergies      No past surgical history on file.  No family history on file.  Social History     Tobacco Use    Smoking status: Never Smoker    Smokeless tobacco: Never Used   Substance Use Topics    Alcohol use: No    Drug use: No     Review of Systems   Constitutional: Negative for chills and fever.   HENT: Positive for sore throat.    Eyes: Negative for visual disturbance.   Respiratory: Negative for shortness of breath.    Cardiovascular: Negative for chest pain.   Gastrointestinal: Negative for nausea.   Genitourinary: Negative for dysuria.   Musculoskeletal: Negative for back pain.   Skin: Negative for rash.   Neurological: Positive for headaches. Negative for weakness.   Hematological: Does not bruise/bleed easily.       Physical Exam     Initial Vitals [07/11/20 1814]   BP Pulse Resp Temp SpO2   98/65 100 18 98.8 °F (37.1 °C) 98 %      MAP       --         Physical Exam    Nursing note and vitals reviewed.  Constitutional: She appears well-developed and well-nourished.  Non-toxic appearance. No distress.   Limited physical exam due to use of telemedicine technology in light of COVID-19 pandemic  The  patient is alert, oriented, and nontoxic appearing.  No apparent distress.  Respirations are even and unlabored.  Speaking in complete sentences.   HENT:   Head: Normocephalic and atraumatic.   Right Ear: Hearing normal.   Left Ear: Hearing and abnormal external ear normal.   Nose: Nose abnormal.   Eyes: Conjunctivae and EOM are normal.   Neck: Full passive range of motion without pain. Neck supple.   Cardiovascular: Normal rate.   Pulmonary/Chest: Effort normal. No respiratory distress.   Musculoskeletal: Normal range of motion.   Neurological: She is alert and oriented to person, place, and time. She has normal strength. Gait normal. GCS eye subscore is 4. GCS verbal subscore is 5. GCS motor subscore is 6.   Skin: Skin is warm, dry and intact. Capillary refill takes less than 2 seconds. No rash noted.   Psychiatric: She has a normal mood and affect. Her speech is normal and behavior is normal. Judgment and thought content normal. Cognition and memory are normal.         ED Course   Procedures  Labs Reviewed   GROUP A STREP, MOLECULAR   SARS-COV-2 (COVID-19) QUALITATIVE PCR          Imaging Results    None          Medical Decision Making:   History:   Old Medical Records: I decided to obtain old medical records.  Clinical Tests:   Lab Tests: Ordered  ED Management:  This is an emergent evaluation of a patient who presents to the emergency department for further evaluation of potential COVID symptoms.  Patient requests COVID test.  Limited physical exam conducted via telemedicine technology is not significantly concerning.  Patient's vital signs do not suggest sepsis.  No hypoxia or evidence of impending respiratory failure.  Patient's respirations are even and unlabored.  Patient reports tolerating oral intake.  Low suspicion for significant dehydration.  I believe that they are stable for discharge with outpatient follow-up as warranted.      COVID test is pending.      Patient will be discharged with medications  for symptomatic relief.  Patient was instructed on strategies for infectious disease prevention.  All questions answered.  Strict return precautions have been discussed.                                     Clinical Impression:       ICD-10-CM ICD-9-CM   1. Suspected Covid-19 Virus Infection  R68.89              ED Disposition Condition    Discharge Stable        ED Prescriptions     Medication Sig Dispense Start Date End Date Auth. Provider    albuterol (PROVENTIL/VENTOLIN HFA) 90 mcg/actuation inhaler Inhale 1-2 puffs into the lungs every 6 (six) hours as needed for Wheezing or Shortness of Breath. Rescue 6.7 g 7/11/2020 7/11/2021 Mica Rodriguez NP    guaifenesin 100 mg/5 ml (ROBITUSSIN) 100 mg/5 mL syrup Take 5-10 mLs (100-200 mg total) by mouth every 4 (four) hours as needed for Cough. 60 mL 7/11/2020 7/21/2020 Mica Rodriguez NP    benzonatate (TESSALON) 100 MG capsule Take 1 capsule (100 mg total) by mouth 3 (three) times daily as needed. 30 capsule 7/11/2020 7/21/2020 Mica Rodriguez NP    acetaminophen (TYLENOL) 500 mg Cap Take 2 capsules (1,000 mg total) by mouth every 8 (eight) hours as needed (pain/fever). 30 capsule 7/11/2020  Mica Rodriguez NP        Follow-up Information     Follow up With Specialties Details Why Contact Info    Meaghan Connors NP Family Medicine Schedule an appointment as soon as possible for a visit in 3 days  [address]  [address]  [place] [address]  [phone]      [facility]  If symptoms worsen [address] [address] [phone]                                     Mica Rodriguez NP  07/11/20 7996

## 2020-07-12 NOTE — ED NOTES
Pt c/o headache x2 days and sore throat today. Denies known contact with anyone with covid-19. Throat is red, but no swelling or exudate noted.     APPEARANCE: Alert, oriented and in no acute distress.  HEENT: Speaks without hoarseness.  CARDIAC: Normal rate and rhythm.    PERIPHERAL VASCULAR: peripheral pulses present. Normal cap refill. No edema. Warm to touch.    RESPIRATORY:Normal rate and effort. Respirations are equal and unlabored no obvious signs of distress.  GASTRO: soft, nondistended, nontender. Denies nausea, vomiting, or diarrhea.  : voids spontaneously and without difficulty.   MUSC: Full ROM. No obvious deformity. Ambulatory with a steady gait  SKIN: Skin is warm and dry, without discoloration. Mucous membranes moist.  NEURO: Pt is awake, alert, aware of environment. No neurologic deficits noted.

## 2020-07-13 LAB — SARS-COV-2 RNA RESP QL NAA+PROBE: NOT DETECTED

## 2020-08-11 ENCOUNTER — HOSPITAL ENCOUNTER (EMERGENCY)
Facility: HOSPITAL | Age: 20
Discharge: HOME | End: 2020-08-11
Attending: EMERGENCY MEDICINE
Payer: COMMERCIAL

## 2020-08-11 VITALS
DIASTOLIC BLOOD PRESSURE: 58 MMHG | OXYGEN SATURATION: 100 % | SYSTOLIC BLOOD PRESSURE: 99 MMHG | WEIGHT: 135 LBS | TEMPERATURE: 98 F | BODY MASS INDEX: 23.05 KG/M2 | HEART RATE: 80 BPM | RESPIRATION RATE: 16 BRPM | HEIGHT: 64 IN

## 2020-08-11 DIAGNOSIS — N30.00 ACUTE CYSTITIS WITHOUT HEMATURIA: Primary | ICD-10-CM

## 2020-08-11 DIAGNOSIS — R53.83 FATIGUE, UNSPECIFIED TYPE: ICD-10-CM

## 2020-08-11 LAB
ANION GAP SERPL CALC-SCNC: 8 MMOL/L (ref 8–16)
B-HCG UR QL: NEGATIVE
BACTERIA #/AREA URNS HPF: ABNORMAL /HPF
BASOPHILS # BLD AUTO: 0.01 K/UL (ref 0–0.2)
BASOPHILS NFR BLD: 0.3 % (ref 0–1.9)
BILIRUB UR QL STRIP: NEGATIVE
BUN SERPL-MCNC: 6 MG/DL (ref 6–20)
CALCIUM SERPL-MCNC: 9.2 MG/DL (ref 8.7–10.5)
CHLORIDE SERPL-SCNC: 107 MMOL/L (ref 95–110)
CLARITY UR: ABNORMAL
CO2 SERPL-SCNC: 22 MMOL/L (ref 23–29)
COLOR UR: YELLOW
CREAT SERPL-MCNC: 0.8 MG/DL (ref 0.5–1.4)
CTP QC/QA: YES
DIFFERENTIAL METHOD: ABNORMAL
EOSINOPHIL # BLD AUTO: 0 K/UL (ref 0–0.5)
EOSINOPHIL NFR BLD: 1.3 % (ref 0–8)
ERYTHROCYTE [DISTWIDTH] IN BLOOD BY AUTOMATED COUNT: 12.8 % (ref 11.5–14.5)
EST. GFR  (AFRICAN AMERICAN): >60 ML/MIN/1.73 M^2
EST. GFR  (NON AFRICAN AMERICAN): >60 ML/MIN/1.73 M^2
GLUCOSE SERPL-MCNC: 83 MG/DL (ref 70–110)
GLUCOSE UR QL STRIP: NEGATIVE
HCT VFR BLD AUTO: 36.1 % (ref 37–48.5)
HGB BLD-MCNC: 12.1 G/DL (ref 12–16)
HGB UR QL STRIP: NEGATIVE
IMM GRANULOCYTES # BLD AUTO: 0 K/UL (ref 0–0.04)
IMM GRANULOCYTES NFR BLD AUTO: 0 % (ref 0–0.5)
KETONES UR QL STRIP: NEGATIVE
LEUKOCYTE ESTERASE UR QL STRIP: ABNORMAL
LYMPHOCYTES # BLD AUTO: 1.6 K/UL (ref 1–4.8)
LYMPHOCYTES NFR BLD: 51.7 % (ref 18–48)
MCH RBC QN AUTO: 28.9 PG (ref 27–31)
MCHC RBC AUTO-ENTMCNC: 33.5 G/DL (ref 32–36)
MCV RBC AUTO: 86 FL (ref 82–98)
MICROSCOPIC COMMENT: ABNORMAL
MONOCYTES # BLD AUTO: 0.3 K/UL (ref 0.3–1)
MONOCYTES NFR BLD: 9.2 % (ref 4–15)
NEUTROPHILS # BLD AUTO: 1.2 K/UL (ref 1.8–7.7)
NEUTROPHILS NFR BLD: 37.5 % (ref 38–73)
NITRITE UR QL STRIP: NEGATIVE
NRBC BLD-RTO: 0 /100 WBC
PH UR STRIP: 7 [PH] (ref 5–8)
PLATELET # BLD AUTO: 286 K/UL (ref 150–350)
PMV BLD AUTO: 10 FL (ref 9.2–12.9)
POTASSIUM SERPL-SCNC: 3.7 MMOL/L (ref 3.5–5.1)
PROT UR QL STRIP: NEGATIVE
RBC # BLD AUTO: 4.18 M/UL (ref 4–5.4)
RBC #/AREA URNS HPF: 5 /HPF (ref 0–4)
SODIUM SERPL-SCNC: 137 MMOL/L (ref 136–145)
SP GR UR STRIP: 1.02 (ref 1–1.03)
URN SPEC COLLECT METH UR: ABNORMAL
UROBILINOGEN UR STRIP-ACNC: NEGATIVE EU/DL
WBC # BLD AUTO: 3.15 K/UL (ref 3.9–12.7)
WBC #/AREA URNS HPF: 15 /HPF (ref 0–5)

## 2020-08-11 PROCEDURE — 81025 URINE PREGNANCY TEST: CPT | Performed by: NURSE PRACTITIONER

## 2020-08-11 PROCEDURE — 25000003 PHARM REV CODE 250: Performed by: NURSE PRACTITIONER

## 2020-08-11 PROCEDURE — 81000 URINALYSIS NONAUTO W/SCOPE: CPT

## 2020-08-11 PROCEDURE — 63600175 PHARM REV CODE 636 W HCPCS: Performed by: NURSE PRACTITIONER

## 2020-08-11 PROCEDURE — 96374 THER/PROPH/DIAG INJ IV PUSH: CPT

## 2020-08-11 PROCEDURE — 96361 HYDRATE IV INFUSION ADD-ON: CPT

## 2020-08-11 PROCEDURE — 87086 URINE CULTURE/COLONY COUNT: CPT

## 2020-08-11 PROCEDURE — 99284 EMERGENCY DEPT VISIT MOD MDM: CPT | Mod: 25

## 2020-08-11 PROCEDURE — 80048 BASIC METABOLIC PNL TOTAL CA: CPT

## 2020-08-11 PROCEDURE — 85025 COMPLETE CBC W/AUTO DIFF WBC: CPT

## 2020-08-11 PROCEDURE — U0003 INFECTIOUS AGENT DETECTION BY NUCLEIC ACID (DNA OR RNA); SEVERE ACUTE RESPIRATORY SYNDROME CORONAVIRUS 2 (SARS-COV-2) (CORONAVIRUS DISEASE [COVID-19]), AMPLIFIED PROBE TECHNIQUE, MAKING USE OF HIGH THROUGHPUT TECHNOLOGIES AS DESCRIBED BY CMS-2020-01-R: HCPCS

## 2020-08-11 RX ORDER — CEPHALEXIN 500 MG/1
500 CAPSULE ORAL EVERY 12 HOURS
Qty: 14 CAPSULE | Refills: 0 | Status: SHIPPED | OUTPATIENT
Start: 2020-08-11 | End: 2020-08-18

## 2020-08-11 RX ORDER — KETOROLAC TROMETHAMINE 30 MG/ML
15 INJECTION, SOLUTION INTRAMUSCULAR; INTRAVENOUS
Status: COMPLETED | OUTPATIENT
Start: 2020-08-11 | End: 2020-08-11

## 2020-08-11 RX ORDER — CEPHALEXIN 500 MG/1
500 CAPSULE ORAL
Status: COMPLETED | OUTPATIENT
Start: 2020-08-11 | End: 2020-08-11

## 2020-08-11 RX ADMIN — SODIUM CHLORIDE 1000 ML: 0.9 INJECTION, SOLUTION INTRAVENOUS at 03:08

## 2020-08-11 RX ADMIN — CEPHALEXIN 500 MG: 500 CAPSULE ORAL at 04:08

## 2020-08-11 RX ADMIN — SODIUM CHLORIDE 1000 ML: 0.9 INJECTION, SOLUTION INTRAVENOUS at 01:08

## 2020-08-11 RX ADMIN — KETOROLAC TROMETHAMINE 15 MG: 30 INJECTION, SOLUTION INTRAMUSCULAR at 02:08

## 2020-08-11 NOTE — ED PROVIDER NOTES
Encounter Date: 8/11/2020       History     Chief Complaint   Patient presents with    Fatigue     pt works at nursing home, complains of body aches, dizziness and HA, hx of anemia, has not been on any RX meds in 3 mths      20-year-old female with past medical history of anemia and asthma presents the ED for fatigue.  The patient reports over the past several days she has had body aches, lightheadedness, fatigue, and intermittent headache.  No known sick contacts.  She reports that she is tested for COVID at her place of employment and had a negative test yesterday.  No shortness of breath.  She reports generalized chest pain that occurs in different places in her chest throughout the day.  No exertional chest pain.  She denies cough, hemoptysis, vomiting, and diarrhea.  No urinary symptoms.  She has tried nothing for her symptoms.  The patient reports that she was previously on medication for anemia but has not taken it in over 3 months.  No active bleeding or dark tarry stools at this time.  No other complaints at this time.    The history is provided by the patient and medical records.     Review of patient's allergies indicates:   Allergen Reactions    Sulfa (sulfonamide antibiotics) Nausea And Vomiting     Past Medical History:   Diagnosis Date    ADHD     Asthma     Environmental allergies      History reviewed. No pertinent surgical history.  History reviewed. No pertinent family history.  Social History     Tobacco Use    Smoking status: Never Smoker    Smokeless tobacco: Never Used   Substance Use Topics    Alcohol use: No    Drug use: No     Review of Systems   Constitutional: Positive for activity change and fatigue. Negative for fever.   HENT: Negative for congestion.    Eyes: Negative for visual disturbance.   Respiratory: Negative for cough and shortness of breath.    Cardiovascular: Negative for palpitations.   Gastrointestinal: Negative for blood in stool, diarrhea, nausea and vomiting.    Genitourinary: Negative for dysuria.   Musculoskeletal: Positive for myalgias. Negative for back pain.   Skin: Negative for rash.   Allergic/Immunologic: Negative for immunocompromised state.   Neurological: Positive for light-headedness and headaches. Negative for dizziness, weakness and numbness.   Psychiatric/Behavioral: Negative for confusion.       Physical Exam     Initial Vitals [08/11/20 1303]   BP Pulse Resp Temp SpO2   112/69 75 20 97.8 °F (36.6 °C) 100 %      MAP       --         Physical Exam    Nursing note and vitals reviewed.  Constitutional: Vital signs are normal. She appears well-developed and well-nourished. She is active and cooperative.  Non-toxic appearance. She does not have a sickly appearance. She does not appear ill.   HENT:   Head: Normocephalic and atraumatic.   Eyes: Conjunctivae and EOM are normal.   Neck: Normal range of motion. Neck supple.   Cardiovascular: Normal rate and regular rhythm.   Pulmonary/Chest: Effort normal and breath sounds normal.   Abdominal: Soft. Normal appearance and bowel sounds are normal. She exhibits no distension. There is no abdominal tenderness. There is no rigidity, no rebound and no guarding.   Neurological: She is alert and oriented to person, place, and time. She has normal strength. GCS eye subscore is 4. GCS verbal subscore is 5. GCS motor subscore is 6.   Skin: Skin is warm, dry and intact. Capillary refill takes less than 2 seconds. No bruising and no rash noted. No erythema. No pallor.   Psychiatric: She has a normal mood and affect. Her speech is normal and behavior is normal. Judgment and thought content normal. Cognition and memory are normal.         ED Course   Procedures  Labs Reviewed   CBC W/ AUTO DIFFERENTIAL - Abnormal; Notable for the following components:       Result Value    WBC 3.15 (*)     Hematocrit 36.1 (*)     Gran # (ANC) 1.2 (*)     Gran% 37.5 (*)     Lymph% 51.7 (*)     All other components within normal limits   BASIC  METABOLIC PANEL - Abnormal; Notable for the following components:    CO2 22 (*)     All other components within normal limits   URINALYSIS, REFLEX TO URINE CULTURE - Abnormal; Notable for the following components:    Appearance, UA Cloudy (*)     Leukocytes, UA 3+ (*)     All other components within normal limits    Narrative:     Specimen Source->Urine   URINALYSIS MICROSCOPIC - Abnormal; Notable for the following components:    RBC, UA 5 (*)     WBC, UA 15 (*)     Bacteria Moderate (*)     All other components within normal limits    Narrative:     Specimen Source->Urine   CULTURE, URINE   SARS-COV-2 (COVID-19) QUALITATIVE PCR   POCT URINE PREGNANCY          Imaging Results    None          Medical Decision Making:   Differential Diagnosis:   Dehydration, viral, UTI, pregnancy, electrolyte derangement, infection  Clinical Tests:   Lab Tests: Ordered and Reviewed  ED Management:  Labs, IV fluids, keflex, toradol  The patient did demonstrate orthostatic hypotension so she was given 2L IV fluids.   No indication for imaging at this time.  Pt reports negative COVID test yesterday but did request retest in the ED.  I reviewed transmission precautions.  Vitals do not suggest sepsis.  She is not in respiratory distress.  No focal neuro findings on exam.  Pt reports complete resolution of her headache after ED interventions. She is tolerating oral fluids.  I feel her headache is benign.  Patient has no neck stiffness/meningismus, fever, elevated WBC count, elevated blood pressure, confusion, vision loss, temporal artery tenderness, rash, vomiting, photophobia or thunderclap onset to suggest pseudotumor cerebri, meningitis, tumor, ICH, temporal arteritis, or encephalitis. WBC normal. H&H stable. UA reveals infection. Culture sent. Normal renal function.  No CVA tenderness or flank pain to suggest kidney stone or pyelonephritis.  She denies vaginal bleeding and discharge.  Encouraged increased fluid intake.  Pt to follow up  with PCP within 5 days.  I reviewed strict return precautions. In addition, pt is to return to the ED if condition changes, progresses, or if there are any concerns.  Pt verbalized understanding, compliance, and agreement with the treatment plan.                                     Clinical Impression:       ICD-10-CM ICD-9-CM   1. Acute cystitis without hematuria  N30.00 595.0   2. Fatigue, unspecified type  R53.83 780.79                                Isabel Christian, LINDA  08/11/20 8785

## 2020-08-11 NOTE — Clinical Note
Jeremi Haney was seen and treated in our emergency department on 8/11/2020.  She may return to work on 08/13/2020.       If you have any questions or concerns, please don't hesitate to call.      ROBERT Cabrales rn RN

## 2020-08-11 NOTE — ED NOTES
Patient identifiers for Jeremi Haney checked and correct.    LOC: The patient is awake, alert and aware of environment with an appropriate affect, the patient is oriented x 3 and speaking appropriately.  APPEARANCE: Patient resting comfortably and in no acute distress, patient is clean and well groomed, patient's clothing are properly fastened.  SKIN: The skin is warm and dry, patient has age appropriate skin turgor and moist mucus membranes, skin intact, no breakdown or bruising noted.  MUSCULOSKELETAL: Patient moving all extremities well, no obvious swelling or deformities noted.  RESPIRATORY: Airway is open and patent, respirations are spontaneous, patient has a normal effort and rate, no accessory muscle use noted.  Clear breath sounds bilaterally.  CARDIAC: Patient has a normal rate and rhythm, no periphreal edema noted, capillary refill < 3 seconds.  ABDOMEN: Soft and non tender to palpation, no distention noted.  Normoactive bowel sounds x4.  NEUROLOGIC: PERRL, facial expression is symmetrical, bilateral hand grasp equal and even, normal sensation in all extremities when touched with a finger.

## 2020-08-11 NOTE — DISCHARGE INSTRUCTIONS
Increase your water intake.  FINISH your antibiotics.    Return to the ED if your condition changes, progresses, or if you have any concerns.

## 2020-08-12 LAB
BACTERIA UR CULT: NO GROWTH
SARS-COV-2 RNA RESP QL NAA+PROBE: NOT DETECTED

## 2020-12-30 ENCOUNTER — OFFICE VISIT (OUTPATIENT)
Dept: URGENT CARE | Facility: CLINIC | Age: 20
End: 2020-12-30
Payer: COMMERCIAL

## 2020-12-30 VITALS
TEMPERATURE: 98 F | WEIGHT: 135 LBS | HEIGHT: 64 IN | OXYGEN SATURATION: 100 % | DIASTOLIC BLOOD PRESSURE: 77 MMHG | SYSTOLIC BLOOD PRESSURE: 111 MMHG | BODY MASS INDEX: 23.05 KG/M2 | RESPIRATION RATE: 12 BRPM | HEART RATE: 85 BPM

## 2020-12-30 DIAGNOSIS — R52 GENERALIZED BODY ACHES: Primary | ICD-10-CM

## 2020-12-30 DIAGNOSIS — J30.9 ALLERGIC RHINITIS, UNSPECIFIED SEASONALITY, UNSPECIFIED TRIGGER: ICD-10-CM

## 2020-12-30 LAB
CTP QC/QA: YES
SARS-COV-2 RDRP RESP QL NAA+PROBE: NEGATIVE

## 2020-12-30 PROCEDURE — U0002: ICD-10-PCS | Mod: QW,S$GLB,, | Performed by: INTERNAL MEDICINE

## 2020-12-30 PROCEDURE — 99214 PR OFFICE/OUTPT VISIT, EST, LEVL IV, 30-39 MIN: ICD-10-PCS | Mod: S$GLB,,, | Performed by: INTERNAL MEDICINE

## 2020-12-30 PROCEDURE — 3008F BODY MASS INDEX DOCD: CPT | Mod: CPTII,S$GLB,, | Performed by: INTERNAL MEDICINE

## 2020-12-30 PROCEDURE — 99214 OFFICE O/P EST MOD 30 MIN: CPT | Mod: S$GLB,,, | Performed by: INTERNAL MEDICINE

## 2020-12-30 PROCEDURE — U0002 COVID-19 LAB TEST NON-CDC: HCPCS | Mod: QW,S$GLB,, | Performed by: INTERNAL MEDICINE

## 2020-12-30 PROCEDURE — 3008F PR BODY MASS INDEX (BMI) DOCUMENTED: ICD-10-PCS | Mod: CPTII,S$GLB,, | Performed by: INTERNAL MEDICINE

## 2020-12-31 NOTE — PROGRESS NOTES
"Subjective:       Patient ID: Jeremi Haney is a 20 y.o. female.    Vitals:  height is 5' 4" (1.626 m) and weight is 61.2 kg (135 lb). Her temperature is 97.7 °F (36.5 °C). Her blood pressure is 111/77 and her pulse is 85. Her respiration is 12 and oxygen saturation is 100%.     Chief Complaint: Generalized Body Aches    Other  This is a new problem. The current episode started in the past 7 days (1 wk). The problem occurs 2 to 4 times per day. The problem has been waxing and waning. Associated symptoms include headaches, myalgias and nausea. Pertinent negatives include no abdominal pain, anorexia, arthralgias, change in bowel habit, chest pain, chills, congestion, coughing, diaphoresis, fatigue, fever, joint swelling, neck pain, numbness, rash, sore throat, swollen glands, urinary symptoms, vertigo, visual change, vomiting or weakness. Nothing aggravates the symptoms. She has tried drinking for the symptoms. The treatment provided no relief.       Constitution: Negative for chills, sweating, fatigue and fever.   HENT: Negative for congestion and sore throat.    Neck: Negative for neck pain and painful lymph nodes.   Cardiovascular: Negative for chest pain and leg swelling.   Eyes: Negative for double vision and blurred vision.   Respiratory: Negative for cough and shortness of breath.    Gastrointestinal: Positive for nausea and diarrhea. Negative for abdominal pain and vomiting.   Genitourinary: Negative for dysuria, frequency, urgency and history of kidney stones.   Musculoskeletal: Positive for muscle ache. Negative for joint pain, joint swelling and muscle cramps.   Skin: Negative for color change, pale, rash and bruising.   Allergic/Immunologic: Negative for seasonal allergies.   Neurological: Positive for dizziness and headaches. Negative for history of vertigo, light-headedness, passing out and numbness.   Hematologic/Lymphatic: Negative for swollen lymph nodes.   Psychiatric/Behavioral: Negative for " nervous/anxious, sleep disturbance and depression. The patient is not nervous/anxious.        Objective:      Physical Exam   Constitutional: normal  HENT:   Head: Normocephalic and atraumatic.   Nose: Mucosal edema and rhinorrhea present.   Mouth/Throat: Mucous membranes are moist. Oropharynx is clear.   Eyes: Pupils are equal, round, and reactive to light. Conjunctivae are normal.   Neck: Normal range of motion. Neck supple.   Cardiovascular: Normal rate, regular rhythm, normal heart sounds and normal pulses.   Pulmonary/Chest: Effort normal and breath sounds normal.   Abdominal: Normal appearance.   Neurological: She is alert.   Nursing note and vitals reviewed.        Assessment:       1. Generalized body aches    2. Allergic rhinitis, unspecified seasonality, unspecified trigger        Plan:         Generalized body aches  -     POCT COVID-19 Rapid Screening    Allergic rhinitis, unspecified seasonality, unspecified trigger

## 2021-02-11 ENCOUNTER — HOSPITAL ENCOUNTER (EMERGENCY)
Facility: HOSPITAL | Age: 21
Discharge: HOME | End: 2021-02-11
Attending: PEDIATRICS
Payer: COMMERCIAL

## 2021-02-11 VITALS
OXYGEN SATURATION: 96 % | BODY MASS INDEX: 22.2 KG/M2 | HEART RATE: 90 BPM | HEIGHT: 64 IN | RESPIRATION RATE: 16 BRPM | WEIGHT: 130 LBS | SYSTOLIC BLOOD PRESSURE: 112 MMHG | DIASTOLIC BLOOD PRESSURE: 66 MMHG | TEMPERATURE: 98 F

## 2021-02-11 DIAGNOSIS — N94.10 DYSPAREUNIA IN FEMALE: ICD-10-CM

## 2021-02-11 DIAGNOSIS — N94.2 VAGINISMUS: Primary | ICD-10-CM

## 2021-02-11 LAB
B-HCG UR QL: NEGATIVE
BACTERIA GENITAL QL WET PREP: ABNORMAL
BILIRUB UR QL STRIP: NEGATIVE
CLARITY UR REFRACT.AUTO: CLEAR
CLUE CELLS VAG QL WET PREP: ABNORMAL
COLOR UR AUTO: NORMAL
CTP QC/QA: YES
FILAMENT FUNGI VAG WET PREP-#/AREA: ABNORMAL
GLUCOSE UR QL STRIP: NEGATIVE
HGB UR QL STRIP: NEGATIVE
KETONES UR QL STRIP: NEGATIVE
LEUKOCYTE ESTERASE UR QL STRIP: NEGATIVE
NITRITE UR QL STRIP: NEGATIVE
PH UR STRIP: 7 [PH] (ref 5–8)
PROT UR QL STRIP: NEGATIVE
SP GR UR STRIP: 1.01 (ref 1–1.03)
SPECIMEN SOURCE: ABNORMAL
T VAGINALIS GENITAL QL WET PREP: ABNORMAL
URN SPEC COLLECT METH UR: NORMAL
WBC #/AREA VAG WET PREP: ABNORMAL
YEAST GENITAL QL WET PREP: ABNORMAL

## 2021-02-11 PROCEDURE — 87210 SMEAR WET MOUNT SALINE/INK: CPT

## 2021-02-11 PROCEDURE — 81003 URINALYSIS AUTO W/O SCOPE: CPT

## 2021-02-11 PROCEDURE — 87491 CHLMYD TRACH DNA AMP PROBE: CPT

## 2021-02-11 PROCEDURE — 99282 EMERGENCY DEPT VISIT SF MDM: CPT | Mod: 25

## 2021-02-11 PROCEDURE — 99284 PR EMERGENCY DEPT VISIT,LEVEL IV: ICD-10-PCS | Mod: ,,, | Performed by: PEDIATRICS

## 2021-02-11 PROCEDURE — 87591 N.GONORRHOEAE DNA AMP PROB: CPT

## 2021-02-11 PROCEDURE — 99284 EMERGENCY DEPT VISIT MOD MDM: CPT | Mod: ,,, | Performed by: PEDIATRICS

## 2021-02-11 PROCEDURE — 81025 URINE PREGNANCY TEST: CPT | Performed by: PEDIATRICS

## 2021-02-12 LAB
C TRACH DNA SPEC QL NAA+PROBE: NOT DETECTED
N GONORRHOEA DNA SPEC QL NAA+PROBE: NOT DETECTED

## 2021-04-16 ENCOUNTER — PATIENT MESSAGE (OUTPATIENT)
Dept: RESEARCH | Facility: HOSPITAL | Age: 21
End: 2021-04-16

## 2021-11-22 ENCOUNTER — OCCUPATIONAL HEALTH (OUTPATIENT)
Dept: URGENT CARE | Facility: CLINIC | Age: 21
End: 2021-11-22

## 2021-11-22 DIAGNOSIS — Z02.83 ENCOUNTER FOR DRUG SCREENING: Primary | ICD-10-CM

## 2021-11-22 PROCEDURE — 80305 DRUG TEST PRSMV DIR OPT OBS: CPT | Mod: S$GLB,,, | Performed by: NURSE PRACTITIONER

## 2021-11-22 PROCEDURE — 80305 OOH COLLECTION ONLY DRUG SCREEN: ICD-10-PCS | Mod: S$GLB,,, | Performed by: NURSE PRACTITIONER

## 2021-11-23 ENCOUNTER — OCCUPATIONAL HEALTH (OUTPATIENT)
Dept: URGENT CARE | Facility: CLINIC | Age: 21
End: 2021-11-23

## 2021-11-23 DIAGNOSIS — Z02.83 ENCOUNTER FOR DRUG SCREENING: ICD-10-CM

## 2021-11-23 DIAGNOSIS — Z11.1 PPD SCREENING TEST: Primary | ICD-10-CM

## 2021-11-23 LAB
CTP QC/QA: YES
POC 5 PANEL DRUG SCREEN: NEGATIVE

## 2021-11-23 PROCEDURE — 80305 DRUG TEST PRSMV DIR OPT OBS: CPT | Mod: S$GLB,,, | Performed by: NURSE PRACTITIONER

## 2021-11-23 PROCEDURE — 80305 POCT RAPID DRUG SCREEN 5 PANEL: ICD-10-PCS | Mod: S$GLB,,, | Performed by: NURSE PRACTITIONER

## 2021-11-23 PROCEDURE — 86580 TB INTRADERMAL TEST: CPT | Mod: S$GLB,,, | Performed by: NURSE PRACTITIONER

## 2021-11-23 PROCEDURE — 86580 POCT TB SKIN TEST: ICD-10-PCS | Mod: S$GLB,,, | Performed by: NURSE PRACTITIONER

## 2021-11-29 ENCOUNTER — OCCUPATIONAL HEALTH (OUTPATIENT)
Dept: URGENT CARE | Facility: CLINIC | Age: 21
End: 2021-11-29

## 2021-11-29 DIAGNOSIS — Z11.1 PPD SCREENING TEST: Primary | ICD-10-CM

## 2021-11-29 PROCEDURE — 86580 POCT TB SKIN TEST: ICD-10-PCS | Mod: S$GLB,,, | Performed by: NURSE PRACTITIONER

## 2021-11-29 PROCEDURE — 86580 TB INTRADERMAL TEST: CPT | Mod: S$GLB,,, | Performed by: NURSE PRACTITIONER

## 2021-12-09 ENCOUNTER — OCCUPATIONAL HEALTH (OUTPATIENT)
Dept: URGENT CARE | Facility: CLINIC | Age: 21
End: 2021-12-09

## 2021-12-09 DIAGNOSIS — Z11.1 PPD SCREENING TEST: Primary | ICD-10-CM

## 2021-12-09 PROCEDURE — 86580 POCT TB SKIN TEST: ICD-10-PCS | Mod: S$GLB,,, | Performed by: NURSE PRACTITIONER

## 2021-12-09 PROCEDURE — 86580 TB INTRADERMAL TEST: CPT | Mod: S$GLB,,, | Performed by: NURSE PRACTITIONER

## 2022-04-12 ENCOUNTER — HOSPITAL ENCOUNTER (EMERGENCY)
Facility: HOSPITAL | Age: 22
Discharge: HOME | End: 2022-04-12
Attending: EMERGENCY MEDICINE
Payer: COMMERCIAL

## 2022-04-12 VITALS
OXYGEN SATURATION: 100 % | SYSTOLIC BLOOD PRESSURE: 101 MMHG | HEART RATE: 75 BPM | TEMPERATURE: 98 F | DIASTOLIC BLOOD PRESSURE: 60 MMHG | RESPIRATION RATE: 18 BRPM

## 2022-04-12 DIAGNOSIS — R11.2 NON-INTRACTABLE VOMITING WITH NAUSEA, UNSPECIFIED VOMITING TYPE: ICD-10-CM

## 2022-04-12 DIAGNOSIS — R10.84 GENERALIZED ABDOMINAL PAIN: Primary | ICD-10-CM

## 2022-04-12 DIAGNOSIS — R10.9 ABDOMINAL PAIN: ICD-10-CM

## 2022-04-12 LAB
ALBUMIN SERPL BCP-MCNC: 4.5 G/DL (ref 3.5–5.2)
ALP SERPL-CCNC: 73 U/L (ref 55–135)
ALT SERPL W/O P-5'-P-CCNC: 12 U/L (ref 10–44)
AMPHET+METHAMPHET UR QL: NEGATIVE
ANION GAP SERPL CALC-SCNC: 9 MMOL/L (ref 8–16)
AST SERPL-CCNC: 21 U/L (ref 10–40)
B-HCG UR QL: NEGATIVE
BACTERIA #/AREA URNS HPF: ABNORMAL /HPF
BARBITURATES UR QL SCN>200 NG/ML: NEGATIVE
BASOPHILS # BLD AUTO: 0 K/UL (ref 0–0.2)
BASOPHILS NFR BLD: 0 % (ref 0–1.9)
BENZODIAZ UR QL SCN>200 NG/ML: NEGATIVE
BILIRUB SERPL-MCNC: 0.4 MG/DL (ref 0.1–1)
BILIRUB UR QL STRIP: NEGATIVE
BUN SERPL-MCNC: 10 MG/DL (ref 6–20)
BZE UR QL SCN: NEGATIVE
CALCIUM SERPL-MCNC: 9 MG/DL (ref 8.7–10.5)
CANNABINOIDS UR QL SCN: NEGATIVE
CHLORIDE SERPL-SCNC: 110 MMOL/L (ref 95–110)
CK SERPL-CCNC: 156 U/L (ref 20–180)
CLARITY UR: CLEAR
CO2 SERPL-SCNC: 21 MMOL/L (ref 23–29)
COLOR UR: YELLOW
CREAT SERPL-MCNC: 0.7 MG/DL (ref 0.5–1.4)
CREAT UR-MCNC: 232.5 MG/DL (ref 15–325)
CTP QC/QA: YES
DIFFERENTIAL METHOD: ABNORMAL
EOSINOPHIL # BLD AUTO: 0 K/UL (ref 0–0.5)
EOSINOPHIL NFR BLD: 0 % (ref 0–8)
ERYTHROCYTE [DISTWIDTH] IN BLOOD BY AUTOMATED COUNT: 14.6 % (ref 11.5–14.5)
EST. GFR  (AFRICAN AMERICAN): >60 ML/MIN/1.73 M^2
EST. GFR  (NON AFRICAN AMERICAN): >60 ML/MIN/1.73 M^2
GLUCOSE SERPL-MCNC: 115 MG/DL (ref 70–110)
GLUCOSE UR QL STRIP: NEGATIVE
HCT VFR BLD AUTO: 31.8 % (ref 37–48.5)
HGB BLD-MCNC: 10.1 G/DL (ref 12–16)
HGB UR QL STRIP: ABNORMAL
HYALINE CASTS #/AREA URNS LPF: 0 /LPF
IMM GRANULOCYTES # BLD AUTO: 0.01 K/UL (ref 0–0.04)
IMM GRANULOCYTES NFR BLD AUTO: 0.4 % (ref 0–0.5)
INFLUENZA A, MOLECULAR: NEGATIVE
INFLUENZA B, MOLECULAR: NEGATIVE
KETONES UR QL STRIP: ABNORMAL
LEUKOCYTE ESTERASE UR QL STRIP: ABNORMAL
LIPASE SERPL-CCNC: 21 U/L (ref 4–60)
LYMPHOCYTES # BLD AUTO: 0.5 K/UL (ref 1–4.8)
LYMPHOCYTES NFR BLD: 20.2 % (ref 18–48)
MCH RBC QN AUTO: 25.9 PG (ref 27–31)
MCHC RBC AUTO-ENTMCNC: 31.8 G/DL (ref 32–36)
MCV RBC AUTO: 82 FL (ref 82–98)
METHADONE UR QL SCN>300 NG/ML: NEGATIVE
MICROSCOPIC COMMENT: ABNORMAL
MONOCYTES # BLD AUTO: 0.1 K/UL (ref 0.3–1)
MONOCYTES NFR BLD: 4 % (ref 4–15)
NEUTROPHILS # BLD AUTO: 1.9 K/UL (ref 1.8–7.7)
NEUTROPHILS NFR BLD: 75.4 % (ref 38–73)
NITRITE UR QL STRIP: NEGATIVE
NON-SQ EPI CELLS #/AREA URNS HPF: 3 /HPF
NRBC BLD-RTO: 0 /100 WBC
OPIATES UR QL SCN: NEGATIVE
PCP UR QL SCN>25 NG/ML: NEGATIVE
PH UR STRIP: 6 [PH] (ref 5–8)
PLATELET # BLD AUTO: 311 K/UL (ref 150–450)
PMV BLD AUTO: 10.2 FL (ref 9.2–12.9)
POTASSIUM SERPL-SCNC: 4 MMOL/L (ref 3.5–5.1)
PROT SERPL-MCNC: 8.3 G/DL (ref 6–8.4)
PROT UR QL STRIP: ABNORMAL
RBC # BLD AUTO: 3.9 M/UL (ref 4–5.4)
RBC #/AREA URNS HPF: >100 /HPF (ref 0–4)
SODIUM SERPL-SCNC: 140 MMOL/L (ref 136–145)
SP GR UR STRIP: >1.03 (ref 1–1.03)
SPECIMEN SOURCE: NORMAL
SQUAMOUS #/AREA URNS HPF: 1 /HPF
TOXICOLOGY INFORMATION: NORMAL
URN SPEC COLLECT METH UR: ABNORMAL
UROBILINOGEN UR STRIP-ACNC: NEGATIVE EU/DL
WBC # BLD AUTO: 2.53 K/UL (ref 3.9–12.7)
WBC #/AREA URNS HPF: 14 /HPF (ref 0–5)

## 2022-04-12 PROCEDURE — 96374 THER/PROPH/DIAG INJ IV PUSH: CPT

## 2022-04-12 PROCEDURE — 85025 COMPLETE CBC W/AUTO DIFF WBC: CPT | Performed by: PHYSICIAN ASSISTANT

## 2022-04-12 PROCEDURE — 83690 ASSAY OF LIPASE: CPT | Performed by: PHYSICIAN ASSISTANT

## 2022-04-12 PROCEDURE — 96375 TX/PRO/DX INJ NEW DRUG ADDON: CPT

## 2022-04-12 PROCEDURE — 81000 URINALYSIS NONAUTO W/SCOPE: CPT | Mod: 59 | Performed by: EMERGENCY MEDICINE

## 2022-04-12 PROCEDURE — 80053 COMPREHEN METABOLIC PANEL: CPT | Performed by: PHYSICIAN ASSISTANT

## 2022-04-12 PROCEDURE — 96361 HYDRATE IV INFUSION ADD-ON: CPT

## 2022-04-12 PROCEDURE — 81025 URINE PREGNANCY TEST: CPT | Performed by: PHYSICIAN ASSISTANT

## 2022-04-12 PROCEDURE — 99284 EMERGENCY DEPT VISIT MOD MDM: CPT | Mod: 25

## 2022-04-12 PROCEDURE — 87502 INFLUENZA DNA AMP PROBE: CPT

## 2022-04-12 PROCEDURE — 25000003 PHARM REV CODE 250: Performed by: PHYSICIAN ASSISTANT

## 2022-04-12 PROCEDURE — 25000003 PHARM REV CODE 250: Performed by: EMERGENCY MEDICINE

## 2022-04-12 PROCEDURE — 82550 ASSAY OF CK (CPK): CPT | Performed by: PHYSICIAN ASSISTANT

## 2022-04-12 PROCEDURE — 80307 DRUG TEST PRSMV CHEM ANLYZR: CPT | Performed by: EMERGENCY MEDICINE

## 2022-04-12 PROCEDURE — 87502 INFLUENZA DNA AMP PROBE: CPT | Performed by: EMERGENCY MEDICINE

## 2022-04-12 PROCEDURE — 63600175 PHARM REV CODE 636 W HCPCS: Performed by: EMERGENCY MEDICINE

## 2022-04-12 RX ORDER — METOCLOPRAMIDE 10 MG/1
10 TABLET ORAL EVERY 6 HOURS PRN
Qty: 14 TABLET | Refills: 0 | Status: SHIPPED | OUTPATIENT
Start: 2022-04-12 | End: 2024-01-31

## 2022-04-12 RX ORDER — LACTULOSE 10 G/15ML
10 SOLUTION ORAL 2 TIMES DAILY PRN
Qty: 150 ML | Refills: 0 | Status: SHIPPED | OUTPATIENT
Start: 2022-04-12 | End: 2024-01-31

## 2022-04-12 RX ORDER — ONDANSETRON 2 MG/ML
4 INJECTION INTRAMUSCULAR; INTRAVENOUS
Status: DISCONTINUED | OUTPATIENT
Start: 2022-04-12 | End: 2022-04-12

## 2022-04-12 RX ORDER — PROCHLORPERAZINE EDISYLATE 5 MG/ML
10 INJECTION INTRAMUSCULAR; INTRAVENOUS
Status: COMPLETED | OUTPATIENT
Start: 2022-04-12 | End: 2022-04-12

## 2022-04-12 RX ORDER — DICYCLOMINE HYDROCHLORIDE 20 MG/1
20 TABLET ORAL 3 TIMES DAILY PRN
Qty: 14 TABLET | Refills: 0 | Status: SHIPPED | OUTPATIENT
Start: 2022-04-12 | End: 2022-05-12

## 2022-04-12 RX ORDER — MAG HYDROX/ALUMINUM HYD/SIMETH 200-200-20
30 SUSPENSION, ORAL (FINAL DOSE FORM) ORAL ONCE
Status: COMPLETED | OUTPATIENT
Start: 2022-04-12 | End: 2022-04-12

## 2022-04-12 RX ORDER — KETOROLAC TROMETHAMINE 30 MG/ML
15 INJECTION, SOLUTION INTRAMUSCULAR; INTRAVENOUS
Status: COMPLETED | OUTPATIENT
Start: 2022-04-12 | End: 2022-04-12

## 2022-04-12 RX ORDER — LIDOCAINE HYDROCHLORIDE 20 MG/ML
10 SOLUTION OROPHARYNGEAL ONCE
Status: COMPLETED | OUTPATIENT
Start: 2022-04-12 | End: 2022-04-12

## 2022-04-12 RX ADMIN — LIDOCAINE HYDROCHLORIDE 10 ML: 20 SOLUTION ORAL; TOPICAL at 03:04

## 2022-04-12 RX ADMIN — ALUMINUM HYDROXIDE, MAGNESIUM HYDROXIDE, AND SIMETHICONE 30 ML: 200; 200; 20 SUSPENSION ORAL at 03:04

## 2022-04-12 RX ADMIN — KETOROLAC TROMETHAMINE 15 MG: 30 INJECTION, SOLUTION INTRAMUSCULAR at 03:04

## 2022-04-12 RX ADMIN — PROCHLORPERAZINE EDISYLATE 10 MG: 5 INJECTION INTRAMUSCULAR; INTRAVENOUS at 03:04

## 2022-04-12 RX ADMIN — SODIUM CHLORIDE 1000 ML: 0.9 INJECTION, SOLUTION INTRAVENOUS at 03:04

## 2022-04-12 NOTE — ED PROVIDER NOTES
Encounter Date: 4/12/2022       History     Chief Complaint   Patient presents with    Vomiting     C/o generalized body aches and vomiting starting at 4 am today. Last emesis while waiting. Denies sick contact. Mild distress noted     21-year-old female presents to the emergency department complaining of nausea, vomiting, headache, body aches, abdominal pain.  Onset last night.  States she began having a headache and body aches as well as some intermittent generalized abdominal cramping pain last night.  Describes a generalized, throbbing headache that has been constant since onset last night, somewhat worse today.  No eliciting, exacerbating, or alleviating factors reported.  Abdominal pain became constant today and has fluctuating intensity without exacerbating alleviating factors.  Denies any constipation, diarrhea, dysuria, frequency, urgency.  Notes several episodes of nonbloody, nonbilious emesis today.  Denies any chest pain, shortness of breath, but does note a mild, nonproductive cough.  Denies any congestion or sore throat or fever.  No other symptoms reported at this time.         Review of patient's allergies indicates:   Allergen Reactions    Sulfa (sulfonamide antibiotics) Nausea And Vomiting     Past Medical History:   Diagnosis Date    ADHD     Asthma     Environmental allergies      No past surgical history on file.  No family history on file.  Social History     Tobacco Use    Smoking status: Never Smoker    Smokeless tobacco: Never Used   Substance Use Topics    Alcohol use: No    Drug use: No     Review of Systems   Constitutional: Negative for chills, fatigue and fever.   HENT: Negative for congestion and sore throat.    Eyes: Negative for photophobia and visual disturbance.   Respiratory: Positive for cough. Negative for shortness of breath.    Cardiovascular: Negative for chest pain and palpitations.   Gastrointestinal: Positive for abdominal pain, nausea and vomiting.    Musculoskeletal: Negative for back pain, neck pain and neck stiffness.   Neurological: Positive for headaches. Negative for light-headedness and numbness.       Physical Exam     Initial Vitals [04/12/22 1101]   BP Pulse Resp Temp SpO2   102/61 78 17 98.3 °F (36.8 °C) 100 %      MAP       --         Physical Exam    Nursing note and vitals reviewed.  Constitutional: She appears well-developed and well-nourished. No distress.   HENT:   Head: Normocephalic and atraumatic.   Eyes: Conjunctivae and EOM are normal. Pupils are equal, round, and reactive to light.   Neck: Neck supple. No tracheal deviation present.   Normal range of motion.  Cardiovascular: Normal rate and intact distal pulses.   Pulmonary/Chest: No respiratory distress.   Abdominal: Abdomen is soft. She exhibits no distension. There is abdominal tenderness (Diffuse, mild). There is no rebound and no guarding.   Musculoskeletal:         General: No tenderness or edema. Normal range of motion.      Cervical back: Normal range of motion and neck supple.     Neurological: She is alert and oriented to person, place, and time. She has normal strength. No cranial nerve deficit. GCS score is 15. GCS eye subscore is 4. GCS verbal subscore is 5. GCS motor subscore is 6.   Skin: Skin is warm and dry.         ED Course   Procedures  Labs Reviewed   CBC W/ AUTO DIFFERENTIAL - Abnormal; Notable for the following components:       Result Value    WBC 2.53 (*)     RBC 3.90 (*)     Hemoglobin 10.1 (*)     Hematocrit 31.8 (*)     MCH 25.9 (*)     MCHC 31.8 (*)     RDW 14.6 (*)     Lymph # 0.5 (*)     Mono # 0.1 (*)     Gran % 75.4 (*)     All other components within normal limits   COMPREHENSIVE METABOLIC PANEL - Abnormal; Notable for the following components:    CO2 21 (*)     Glucose 115 (*)     All other components within normal limits   INFLUENZA A & B BY MOLECULAR   LIPASE   CK   CK   URINALYSIS   DRUG SCREEN PANEL, URINE EMERGENCY   URINALYSIS MICROSCOPIC   POCT  URINE PREGNANCY            X-Rays:   Independently Interpreted Readings:   Other Readings:  Imaging interpreted by radiologist and visualized by me:     Imaging Results          X-Ray Abdomen Flat And Erect (Final result)  Result time 04/12/22 16:13:24    Final result by Navi John MD (04/12/22 16:13:24)                 Impression:      No acute process.      Electronically signed by: Navi John MD  Date:    04/12/2022  Time:    16:13             Narrative:    EXAMINATION:  XR ABDOMEN FLAT AND ERECT    CLINICAL HISTORY:  Unspecified abdominal pain    TECHNIQUE:  Flat and erect AP views of the abdomen were performed.    COMPARISON:  None    FINDINGS:  No evidence of obstruction.  No abnormal calcifications.  No free gas.  No acute osseous process.  Lung bases are clear.                                Medications   sodium chloride 0.9% bolus 1,000 mL (0 mLs Intravenous Stopped 4/12/22 1617)   prochlorperazine injection Soln 10 mg (10 mg Intravenous Given 4/12/22 1524)   ketorolac injection 15 mg (15 mg Intravenous Given 4/12/22 1557)   aluminum-magnesium hydroxide-simethicone 200-200-20 mg/5 mL suspension 30 mL (30 mLs Oral Given 4/12/22 1558)     And   LIDOcaine HCl 2% oral solution 10 mL (10 mLs Oral Given 4/12/22 1558)     Medical Decision Making:   Initial Assessment:   21-year-old female presents emergency department complaining of headache, cough, body aches, abdominal pain, nausea, vomiting  Differential Diagnosis:   Migraine versus tension versus cluster versus sinus headache, URI, COVID, influenza, diverticulitis, cholecystitis, pancreatitis, appendicitis, obstruction, constipation UTI, pyelonephritis, kidney stone, gastroenteritis, pregnancy, threatened versus inevitable versus missed miscarriage, ectopic  Independently Interpreted Test(s):   I have ordered and independently interpreted X-rays - see prior notes.  Clinical Tests:   Lab Tests: Reviewed       <> Summary of Lab: Benign  ED  Management:  Patient given IV fluid, Compazine, Toradol, GI cocktail.  She is tolerating p.o. and requesting discharge.  Informed of results as well as plan to discharge with prescriptions for Bentyl, Reglan, lactulose, instructed to follow up with primary care physician.  Vital signs stable at time of discharge.                       Clinical Impression:   Final diagnoses:  [R10.9] Abdominal pain  [R10.84] Generalized abdominal pain (Primary)  [R11.2] Non-intractable vomiting with nausea, unspecified vomiting type          ED Disposition Condition    Discharge Stable        ED Prescriptions     Medication Sig Dispense Start Date End Date Auth. Provider    dicyclomine (BENTYL) 20 mg tablet Take 1 tablet (20 mg total) by mouth 3 (three) times daily as needed (for abdominal cramping). 14 tablet 4/12/2022 5/12/2022 Dez Aguilar MD    metoclopramide HCl (REGLAN) 10 MG tablet Take 1 tablet (10 mg total) by mouth every 6 (six) hours as needed (Nausea). 14 tablet 4/12/2022  Dez Aguilar MD    lactulose (CHRONULAC) 20 gram/30 mL Soln Take 15 mLs (10 g total) by mouth 2 (two) times daily as needed (Constipation). 150 mL 4/12/2022  Dez Aguilar MD        Follow-up Information     Follow up With Specialties Details Why Contact Info    Meaghan Connors NP Family Medicine Schedule an appointment as soon as possible for a visit   [address]  [address]  [place] [id number] [phone]             Dez Aguilar MD  04/12/22 0839

## 2022-04-12 NOTE — DISCHARGE INSTRUCTIONS
Please make sure you are drinking plenty of fluids such as Gatorade or Powerade.  I recommend taking ibuprofen 600 mg every 8 hours with food and/or Tylenol 650 mg every 8 hours as needed for pain in addition to the prescribed medications.  Please follow-up with your primary care physician.  Please return with any new or worsening symptoms.

## 2022-04-12 NOTE — FIRST PROVIDER EVALUATION
Emergency Department TeleTriage Encounter Note      CHIEF COMPLAINT    Chief Complaint   Patient presents with    Vomiting     C/o generalized body aches and vomiting starting at 4 am today. Last emesis while waiting. Denies sick contact. Mild distress noted       VITAL SIGNS   Initial Vitals [04/12/22 1101]   BP Pulse Resp Temp SpO2   102/61 78 17 98.3 °F (36.8 °C) 100 %      MAP       --            ALLERGIES    Review of patient's allergies indicates:   Allergen Reactions    Sulfa (sulfonamide antibiotics) Nausea And Vomiting       PROVIDER TRIAGE NOTE  This is a teletriage evaluation of a 21 y.o. female presenting to the ED complaining of vomiting. Patient reports vomiting and soft stool since 4am. She reports generalized abdominal pain and body aches as well. She reports feeling weak. She denies bloody stool or emesis. She has not taken any medications for her symptoms.     Initial orders will be placed and care will be transferred to an alternate provider when patient is roomed for a full evaluation. Any additional orders and the final disposition will be determined by that provider.           ORDERS  Labs Reviewed   CBC W/ AUTO DIFFERENTIAL   COMPREHENSIVE METABOLIC PANEL   LIPASE   URINALYSIS, REFLEX TO URINE CULTURE   POCT URINE PREGNANCY       ED Orders (720h ago, onward)    Start Ordered     Status Ordering Provider    04/12/22 1230 04/12/22 1224  ondansetron injection 4 mg  ED 1 Time         Ordered ROSA, ANNELISE G.    04/12/22 1230 04/12/22 1224  sodium chloride 0.9% bolus 1,000 mL  Once         Ordered ROSA, ANNELISE G.    04/12/22 1225 04/12/22 1224  Vital signs  Every 2 hours         Ordered ROSA, ANNELISE G.    04/12/22 1225 04/12/22 1224  Diet NPO  Diet effective now         Ordered ROSA, ANNELISE G.    04/12/22 1225 04/12/22 1224  Insert peripheral IV  Once         Ordered ROSA, ANNELISE G.    04/12/22 1225 04/12/22 1224  CBC W/ AUTO DIFFERENTIAL  STAT         Ordered ROSA, ANNELISE G.    04/12/22 1225  04/12/22 1224  Comp. Metabolic Panel  STAT         Ordered ANNELISE LEE.    04/12/22 1225 04/12/22 1224  Lipase  STAT         Ordered ANNELISE LEE.    04/12/22 1225 04/12/22 1224  Urinalysis, Reflex to Urine Culture Urine, Clean Catch  STAT         Ordered ANNELISE LEE.    04/12/22 1225 04/12/22 1224  POCT urine pregnancy  Once         Ordered ANNELISE LEE.            Virtual Visit Note: The provider triage portion of this emergency department evaluation and documentation was performed via [employer], a HIPAA-compliant telemedicine application, in concert with a tele-presenter in the room. A face to face patient evaluation with one of my colleagues will occur once the patient is placed in an emergency department room.      DISCLAIMER: This note was prepared with [employer] voice recognition transcription software. Garbled syntax, mangled pronouns, and other bizarre constructions may be attributed to that software system.

## 2022-07-21 ENCOUNTER — HOSPITAL ENCOUNTER (EMERGENCY)
Facility: HOSPITAL | Age: 22
Discharge: HOME | End: 2022-07-21
Attending: EMERGENCY MEDICINE

## 2022-07-21 VITALS
DIASTOLIC BLOOD PRESSURE: 60 MMHG | OXYGEN SATURATION: 97 % | HEIGHT: 63 IN | HEART RATE: 75 BPM | RESPIRATION RATE: 19 BRPM | SYSTOLIC BLOOD PRESSURE: 112 MMHG | BODY MASS INDEX: 23.92 KG/M2 | WEIGHT: 135 LBS | TEMPERATURE: 98 F

## 2022-07-21 DIAGNOSIS — S39.012A BACK STRAIN, INITIAL ENCOUNTER: ICD-10-CM

## 2022-07-21 DIAGNOSIS — M25.552 LEFT HIP PAIN: ICD-10-CM
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
## 2022-07-21 DIAGNOSIS — S16.1XXA CERVICAL STRAIN, ACUTE, INITIAL ENCOUNTER: Primary | ICD-10-CM

## 2022-07-21 LAB
B-HCG UR QL: NEGATIVE
CTP QC/QA: YES

## 2022-07-21 PROCEDURE — 25000003 PHARM REV CODE 250: Performed by: EMERGENCY MEDICINE

## 2022-07-21 PROCEDURE — 81025 URINE PREGNANCY TEST: CPT | Performed by: NURSE PRACTITIONER

## 2022-07-21 PROCEDURE — 99284 EMERGENCY DEPT VISIT MOD MDM: CPT | Mod: 25

## 2022-07-21 RX ORDER — CYCLOBENZAPRINE HCL 10 MG
10 TABLET ORAL 3 TIMES DAILY PRN
Qty: 15 TABLET | Refills: 0 | Status: SHIPPED | OUTPATIENT
Start: 2022-07-21 | End: 2022-07-26

## 2022-07-21 RX ORDER — ACETAMINOPHEN 500 MG
1000 TABLET ORAL
Status: COMPLETED | OUTPATIENT
Start: 2022-07-21 | End: 2022-07-21

## 2022-07-21 RX ORDER — CYCLOBENZAPRINE HCL 10 MG
10 TABLET ORAL
Status: COMPLETED | OUTPATIENT
Start: 2022-07-21 | End: 2022-07-21

## 2022-07-21 RX ORDER — IBUPROFEN 600 MG/1
600 TABLET ORAL
Status: COMPLETED | OUTPATIENT
Start: 2022-07-21 | End: 2022-07-21

## 2022-07-21 RX ADMIN — IBUPROFEN 600 MG: 600 TABLET ORAL at 08:07

## 2022-07-21 RX ADMIN — ACETAMINOPHEN 1000 MG: 500 TABLET ORAL at 08:07

## 2022-07-21 RX ADMIN — CYCLOBENZAPRINE HYDROCHLORIDE 10 MG: 10 TABLET, FILM COATED ORAL at 08:07
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
## 2022-07-21 NOTE — Clinical Note
"Javier Sanchezarturo" Lyndon was seen and treated in our emergency department on 7/21/2022.  She may return to work on 07/23/2022.       If you have any questions or concerns, please don't hesitate to call.      Td Harper MD"

## 2022-07-22 NOTE — ED NOTES
APPEARANCE: Awake, alert, & oriented. No acute distress.  CARDIAC: Normal rate and rhythm. Denies chest pain.     RESPIRATORY: Respirations are even and unlabored no obvious signs of distress. No accessory muscle use. Breath sounds clear bilaterally throughout chest.  PERIPHERAL VASCULAR: peripheral pulses present. Normal cap refill. No edema.   GASTRO: soft, no tenderness, no abdominal distention.  MUSC: Full ROM. No bony tenderness or soft tissue tenderness except neck with movement. No obvious deformity.  SKIN: Skin is warm, dry, and intact. Normal skin turgor and color.  NEURO: Equal strength bilaterally. Pep coma scale: Eye Response-4, Motor Response-6, Verbal Response-5. Total=15. Clear speech. No neurological abnormalities.   EENT: No c/o vision or hearing difficulties. Oropharynx clear.
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
## 2023-06-13 ENCOUNTER — PATIENT MESSAGE (OUTPATIENT)
Dept: RESEARCH | Facility: HOSPITAL | Age: 23
End: 2023-06-13
Payer: COMMERCIAL

## 2024-01-26 ENCOUNTER — NURSE TRIAGE (OUTPATIENT)
Dept: ADMINISTRATIVE | Facility: CLINIC | Age: 24
End: 2024-01-26
Payer: COMMERCIAL

## 2024-01-26 NOTE — TELEPHONE ENCOUNTER
Pt transferred to OOC line c/o vaginal bleeding.   Pt states unsure if she was pregnant or not, LMP last week of December.Had some bleeding 2 days ago, only lasted only 2 days.  Starting bleeding early this morning, very dark bleeding & heavy. Bleeding now, lighter or brighter red.     Pt is currently in UC being evaluated, waiting to provide urine sample. Pt informed unable to triage while under care actively pt states she was calling to schedule apt with OBGYN for irregular menstrual cycles, trying to est OBGYN care. Pt advised once current UC visit completed, to callback to schedule f/u and est care with OBGYN then. Pt vu.   Reason for Disposition   Health Information question, no triage required and triager able to answer question    Protocols used: Information Only Call - No Triage-A-

## 2024-01-31 ENCOUNTER — OFFICE VISIT (OUTPATIENT)
Dept: OBSTETRICS AND GYNECOLOGY | Facility: CLINIC | Age: 24
End: 2024-01-31
Payer: COMMERCIAL

## 2024-01-31 ENCOUNTER — LAB VISIT (OUTPATIENT)
Dept: LAB | Facility: HOSPITAL | Age: 24
End: 2024-01-31
Payer: COMMERCIAL

## 2024-01-31 VITALS
BODY MASS INDEX: 23.79 KG/M2 | HEIGHT: 63 IN | WEIGHT: 134.25 LBS | DIASTOLIC BLOOD PRESSURE: 68 MMHG | SYSTOLIC BLOOD PRESSURE: 110 MMHG

## 2024-01-31 DIAGNOSIS — Z11.3 SCREEN FOR STD (SEXUALLY TRANSMITTED DISEASE): ICD-10-CM

## 2024-01-31 DIAGNOSIS — Z12.39 SCREENING BREAST EXAMINATION: ICD-10-CM

## 2024-01-31 DIAGNOSIS — N93.9 ABNORMAL UTERINE BLEEDING (AUB): ICD-10-CM

## 2024-01-31 DIAGNOSIS — Z01.419 ENCOUNTER FOR GYNECOLOGICAL EXAMINATION WITHOUT ABNORMAL FINDING: Primary | ICD-10-CM

## 2024-01-31 LAB
BASOPHILS # BLD AUTO: 0.02 K/UL (ref 0–0.2)
BASOPHILS NFR BLD: 0.6 % (ref 0–1.9)
DIFFERENTIAL METHOD BLD: ABNORMAL
EOSINOPHIL # BLD AUTO: 0.1 K/UL (ref 0–0.5)
EOSINOPHIL NFR BLD: 3.4 % (ref 0–8)
ERYTHROCYTE [DISTWIDTH] IN BLOOD BY AUTOMATED COUNT: 12.3 % (ref 11.5–14.5)
HCG INTACT+B SERPL-ACNC: <1.2 MIU/ML
HCT VFR BLD AUTO: 36.5 % (ref 37–48.5)
HGB BLD-MCNC: 11.7 G/DL (ref 12–16)
IMM GRANULOCYTES # BLD AUTO: 0 K/UL (ref 0–0.04)
IMM GRANULOCYTES NFR BLD AUTO: 0 % (ref 0–0.5)
LYMPHOCYTES # BLD AUTO: 1.6 K/UL (ref 1–4.8)
LYMPHOCYTES NFR BLD: 43.7 % (ref 18–48)
MCH RBC QN AUTO: 28.7 PG (ref 27–31)
MCHC RBC AUTO-ENTMCNC: 32.1 G/DL (ref 32–36)
MCV RBC AUTO: 90 FL (ref 82–98)
MONOCYTES # BLD AUTO: 0.4 K/UL (ref 0.3–1)
MONOCYTES NFR BLD: 9.9 % (ref 4–15)
NEUTROPHILS # BLD AUTO: 1.5 K/UL (ref 1.8–7.7)
NEUTROPHILS NFR BLD: 42.4 % (ref 38–73)
NRBC BLD-RTO: 0 /100 WBC
PLATELET # BLD AUTO: 340 K/UL (ref 150–450)
PMV BLD AUTO: 9.4 FL (ref 9.2–12.9)
RBC # BLD AUTO: 4.07 M/UL (ref 4–5.4)
T4 FREE SERPL-MCNC: 0.83 NG/DL (ref 0.71–1.51)
TSH SERPL DL<=0.005 MIU/L-ACNC: 1.58 UIU/ML (ref 0.4–4)
WBC # BLD AUTO: 3.55 K/UL (ref 3.9–12.7)

## 2024-01-31 PROCEDURE — 86803 HEPATITIS C AB TEST: CPT

## 2024-01-31 PROCEDURE — 87389 HIV-1 AG W/HIV-1&-2 AB AG IA: CPT

## 2024-01-31 PROCEDURE — 86592 SYPHILIS TEST NON-TREP QUAL: CPT

## 2024-01-31 PROCEDURE — 83002 ASSAY OF GONADOTROPIN (LH): CPT

## 2024-01-31 PROCEDURE — 87491 CHLMYD TRACH DNA AMP PROBE: CPT

## 2024-01-31 PROCEDURE — 84439 ASSAY OF FREE THYROXINE: CPT

## 2024-01-31 PROCEDURE — 88175 CYTOPATH C/V AUTO FLUID REDO: CPT

## 2024-01-31 PROCEDURE — 1159F MED LIST DOCD IN RCRD: CPT | Mod: CPTII,S$GLB,,

## 2024-01-31 PROCEDURE — 84443 ASSAY THYROID STIM HORMONE: CPT

## 2024-01-31 PROCEDURE — 84702 CHORIONIC GONADOTROPIN TEST: CPT

## 2024-01-31 PROCEDURE — 84146 ASSAY OF PROLACTIN: CPT

## 2024-01-31 PROCEDURE — 99999 PR PBB SHADOW E&M-EST. PATIENT-LVL III: CPT | Mod: PBBFAC,,,

## 2024-01-31 PROCEDURE — 85025 COMPLETE CBC W/AUTO DIFF WBC: CPT

## 2024-01-31 PROCEDURE — 3078F DIAST BP <80 MM HG: CPT | Mod: CPTII,S$GLB,,

## 2024-01-31 PROCEDURE — 87340 HEPATITIS B SURFACE AG IA: CPT

## 2024-01-31 PROCEDURE — 3074F SYST BP LT 130 MM HG: CPT | Mod: CPTII,S$GLB,,

## 2024-01-31 PROCEDURE — 99385 PREV VISIT NEW AGE 18-39: CPT | Mod: S$GLB,,,

## 2024-01-31 PROCEDURE — 83001 ASSAY OF GONADOTROPIN (FSH): CPT

## 2024-01-31 PROCEDURE — 3008F BODY MASS INDEX DOCD: CPT | Mod: CPTII,S$GLB,,

## 2024-01-31 RX ORDER — NITROFURANTOIN 25; 75 MG/1; MG/1
100 CAPSULE ORAL 2 TIMES DAILY
COMMUNITY
Start: 2024-01-26

## 2024-01-31 NOTE — PROGRESS NOTES
Subjective:      Patient ID: Jeremi Haney is a 23 y.o. female.    Chief Complaint:  Well Woman      History of Present Illness  HPI  HISTORY OF PRESENT ILLNESS:    Jeremi Haney is a 23 y.o. female, , Patient's last menstrual period was 2024 (exact date).,  presents for a routine exam and has complaints of AUB. She reports every other month, she will have an episode of 2 periods in 1 month. She recently has been noticing it happen more often. She has been very fatigued lately.    She is  sexually active. She uses no for contraception- not preventing pregnancy. History of STDs: denies.  She does want STD screening.  Denies any other GYN complaints.      The patient participates in regular exercise: yes.  The patient does not smoke.  The patient wears seatbelts.   Pt denies any domestic violence. denies tattoos or blood transfusions    SCREENING:   Pap: DONE TODAY   Gardasil Status: VACCINATED  Mammogram: N/A  Colonoscopy: N/A   DEXA:  N/A     GYN FH:   Breast cancer: Pat GM  Colon cancer: none  Ovarian cancer: none  Endometrial cancer: none    GYN & OB History  Patient's last menstrual period was 2024 (exact date).   Date of Last Pap: 2024    OB History    Para Term  AB Living   0 0 0 0 0 0   SAB IAB Ectopic Multiple Live Births   0 0 0 0 0     Past Medical History:  Past Medical History:   Diagnosis Date    ADHD     Asthma     Environmental allergies        Past Surgical History:  History reviewed. No pertinent surgical history.    Family History:  Family History   Problem Relation Age of Onset    Diabetes Mother     No Known Problems Father     Anxiety disorder Sister     Depression Sister     Personality disorder Sister     Allergies Sister     Allergies Sister     Personality disorder Sister     Depression Sister     Anxiety disorder Sister     Depression Sister     Anxiety disorder Sister     Depression Sister     Anxiety disorder Sister     Personality disorder  Brother     Depression Brother     Anxiety disorder Brother     Depression Brother     Anxiety disorder Brother     Depression Brother     Anxiety disorder Brother     HIV Maternal Grandmother     Stroke Maternal Grandfather     Hypertension Maternal Grandfather     Diabetes Paternal Grandmother     Hypertension Paternal Grandmother     Breast cancer Paternal Grandmother        Allergies:  Review of patient's allergies indicates:   Allergen Reactions    Shellfish derived Swelling    Kiwi      Other Reaction(s): FACIAL IRRITATION    Sulfa (sulfonamide antibiotics) Nausea And Vomiting    Sulfa (sulfonamide antibiotics) Other (See Comments)     As child.  Patient does not remember         Medications:  Current Outpatient Medications on File Prior to Visit   Medication Sig Dispense Refill    nitrofurantoin, macrocrystal-monohydrate, (MACROBID) 100 MG capsule Take 100 mg by mouth 2 (two) times daily.      [DISCONTINUED] acetaminophen (TYLENOL) 500 mg Cap Take 2 capsules (1,000 mg total) by mouth every 8 (eight) hours as needed (pain/fever). 30 capsule 0    [DISCONTINUED] albuterol (PROVENTIL/VENTOLIN HFA) 90 mcg/actuation inhaler Inhale 1-2 puffs into the lungs every 6 (six) hours as needed for Wheezing or Shortness of Breath. Rescue 6.7 g 0    [DISCONTINUED] cetirizine (ZYRTEC) 10 MG tablet Take 1 tablet (10 mg total) by mouth once daily. 30 tablet 0    [DISCONTINUED] ferrous sulfate 324 mg (65 mg iron) TbEC Take 325 mg by mouth once daily.      [DISCONTINUED] fluticasone propionate (FLONASE) 50 mcg/actuation nasal spray 1 spray (50 mcg total) by Each Nostril route 2 (two) times daily as needed for Rhinitis. 15 g 0    [DISCONTINUED] guanFACINE (TENEX) 1 MG Tab Take 1 mg by mouth every evening.      [DISCONTINUED] ibuprofen (ADVIL,MOTRIN) 600 MG tablet Take 1 tablet (600 mg total) by mouth every 6 (six) hours as needed for Pain. 20 tablet 0    [DISCONTINUED] lactulose (CHRONULAC) 20 gram/30 mL Soln Take 15 mLs (10 g  total) by mouth 2 (two) times daily as needed (Constipation). 150 mL 0    [DISCONTINUED] metoclopramide HCl (REGLAN) 10 MG tablet Take 1 tablet (10 mg total) by mouth every 6 (six) hours as needed (Nausea). 14 tablet 0    [DISCONTINUED] trazodone HCl (TRAZODONE ORAL) Take by mouth every evening.       No current facility-administered medications on file prior to visit.       Social History:  Social History     Tobacco Use    Smoking status: Never    Smokeless tobacco: Never   Substance Use Topics    Alcohol use: No    Drug use: No            Review of Systems  Review of Systems   Constitutional:  Negative for activity change and appetite change.   Respiratory:  Negative for shortness of breath.    Cardiovascular:  Negative for chest pain.   Gastrointestinal:  Negative for abdominal pain, diarrhea and nausea.   Genitourinary:  Positive for menorrhagia and menstrual problem. Negative for bladder incontinence, decreased libido, dysmenorrhea, dyspareunia, dysuria, flank pain, frequency, genital sores, hematuria, hot flashes, pelvic pain, urgency, vaginal bleeding, vaginal discharge, vaginal pain, urinary incontinence, postcoital bleeding, postmenopausal bleeding, vaginal dryness and vaginal odor.   Integumentary:  Negative for breast tenderness.   Neurological:  Negative for headaches.   Breast: Negative for breast self exam and tenderness         Objective:     Physical Exam:   Constitutional: She is oriented to person, place, and time. She appears well-developed and well-nourished.    HENT:   Head: Normocephalic.      Cardiovascular:       Exam reveals no clubbing.        Pulmonary/Chest: Effort normal and breath sounds normal. Right breast exhibits no nipple discharge, no skin change, no tenderness, no bleeding and no swelling. Left breast exhibits no nipple discharge, no skin change, no tenderness, no bleeding and no swelling. Breasts are symmetrical.        Abdominal: Soft. There is no abdominal tenderness. Hernia  confirmed negative in the right inguinal area and confirmed negative in the left inguinal area.     Genitourinary:    Inguinal canal, vagina, uterus, right adnexa, left adnexa and rectum normal.   Rectum:      No tenderness.      Pelvic exam was performed with patient supine.   The external female genitalia was normal.   No external genitalia lesions identified,Genitalia hair distrobution normal .     Labial bartholins normal.Cervix is normal. No vaginal discharge or tenderness in the vagina.    No signs of injury in the vagina.   Vagina was moist.Cervix exhibits no discharge and no tenderness. Uterus is not tender. Normal urethral meatus.Urethra findings: no tendernessBladder findings: no bladder tenderness          Musculoskeletal: Normal range of motion and moves all extremeties.      Lymphadenopathy: No inguinal adenopathy noted on the right or left side.    Neurological: She is alert and oriented to person, place, and time.    Skin: Skin is warm. Nails show no clubbing.    Psychiatric: She has a normal mood and affect. Her behavior is normal. Judgment and thought content normal.         Assessment:     1. Encounter for gynecological examination without abnormal finding    2. Screening breast examination    3. Abnormal uterine bleeding (AUB)    4. Screen for STD (sexually transmitted disease)              Plan:   1. Encounter for gynecological examination without abnormal finding  - Liquid-Based Pap Smear, Screening  - Pap done today.  - Screening tests as ordered.  - Diet and exercise encouraged.  Condom use encouraged for STD prevention.  Seat belt use encouraged.  Reviewed ASCCP Pap guidelines and screening recommendations.  Calcium and vitamin D recommended.     Counseling: injury prevention: Driving under the influence of alcohol  Weapons  Seatbelts  Bicycle helmets  Adequate sleep  Exercise  Perimenopause/Menopause  Stress management techniques  indications for and frequency of periodic gynecologic  exam  reviewed current Pap guidelines. Explained new understanding of natural history of cervical disease and improved Paps. Recommended guideline concordant care.    The patient was counseled today on ACS PAP guidelines, with recommendations for yearly pelvic exams unless their uterus, cervix, and ovaries were removed for benign reasons; in that case, examinations every 3-5 years are recommended.  The patient was also counseled regarding monthly breast self-examination, routine STD screening for at-risk populations, prophylactic immunizations for transmitted infections such as  HPV, Pertussis, or Influenza as appropriate, and yearly mammograms when indicated by ACS guidelines.  She was advised to see her primary care physician for all other health maintenance.    FOLLOW-UP with me annually.      2. Screening breast examination  - Self breast exams encouraged    3. Abnormal uterine bleeding (AUB)  - CBC Auto Differential; Future  - TSH; Future  - T4, free; Future  - Prolactin; Future  - Luteinizing Hormone; Future  - HCG, Quantitative; Future  - US Pelvis Comp with Transvag NON-OB (xpd; Future  - Follicle Stimulating Hormone; Future    4. Screen for STD (sexually transmitted disease)  - C. trachomatis/N. gonorrhoeae by AMP DNA  - Hepatitis B Surface Antigen; Future  - Hepatitis C Antibody; Future  - HIV 1/2 Ag/Ab (4th Gen); Future  - RPR; Future     Follow up in 1 year for annual exam or sooner if needed.  LINDA Romero-BC

## 2024-02-01 LAB
FSH SERPL-ACNC: 7.03 MIU/ML
HBV SURFACE AG SERPL QL IA: NORMAL
HCV AB SERPL QL IA: NORMAL
HIV 1+2 AB+HIV1 P24 AG SERPL QL IA: NORMAL
LH SERPL-ACNC: 3.3 MIU/ML
PROLACTIN SERPL IA-MCNC: 15.1 NG/ML (ref 5.2–26.5)

## 2024-02-02 LAB — RPR SER QL: NORMAL

## 2024-02-05 LAB
FINAL PATHOLOGIC DIAGNOSIS: NORMAL
Lab: NORMAL

## 2024-02-06 LAB
C TRACH DNA SPEC QL NAA+PROBE: NOT DETECTED
N GONORRHOEA DNA SPEC QL NAA+PROBE: NOT DETECTED

## 2024-06-06 ENCOUNTER — OCCUPATIONAL HEALTH (OUTPATIENT)
Dept: URGENT CARE | Facility: CLINIC | Age: 24
End: 2024-06-06

## 2024-06-06 DIAGNOSIS — Z02.83 ENCOUNTER FOR DRUG SCREENING: ICD-10-CM

## 2024-06-06 DIAGNOSIS — Z02.1 DRUG SCREENING, PRE-EMPLOYMENT: Primary | ICD-10-CM

## 2024-06-06 LAB
CTP QC/QA: YES
POC 10 PANEL DRUG SCREEN: NEGATIVE

## 2024-06-06 PROCEDURE — 80305 DRUG TEST PRSMV DIR OPT OBS: CPT | Mod: S$GLB,,, | Performed by: FAMILY MEDICINE

## 2024-07-03 ENCOUNTER — OFFICE VISIT (OUTPATIENT)
Dept: URGENT CARE | Facility: CLINIC | Age: 24
End: 2024-07-03
Payer: COMMERCIAL

## 2024-07-03 VITALS
DIASTOLIC BLOOD PRESSURE: 75 MMHG | WEIGHT: 134 LBS | HEIGHT: 63 IN | TEMPERATURE: 98 F | SYSTOLIC BLOOD PRESSURE: 105 MMHG | BODY MASS INDEX: 23.74 KG/M2 | OXYGEN SATURATION: 98 % | HEART RATE: 107 BPM | RESPIRATION RATE: 18 BRPM

## 2024-07-03 DIAGNOSIS — U07.1 COVID-19 VIRUS INFECTION: Primary | ICD-10-CM

## 2024-07-03 DIAGNOSIS — R05.9 COUGH, UNSPECIFIED TYPE: ICD-10-CM

## 2024-07-03 DIAGNOSIS — R11.0 NAUSEA: ICD-10-CM

## 2024-07-03 LAB
B-HCG UR QL: NEGATIVE
CTP QC/QA: YES
CTP QC/QA: YES
SARS-COV-2 AG RESP QL IA.RAPID: POSITIVE

## 2024-07-03 PROCEDURE — 81025 URINE PREGNANCY TEST: CPT | Mod: S$GLB,,, | Performed by: NURSE PRACTITIONER

## 2024-07-03 PROCEDURE — 99204 OFFICE O/P NEW MOD 45 MIN: CPT | Mod: S$GLB,,, | Performed by: NURSE PRACTITIONER

## 2024-07-03 PROCEDURE — 87811 SARS-COV-2 COVID19 W/OPTIC: CPT | Mod: QW,S$GLB,, | Performed by: NURSE PRACTITIONER

## 2024-07-03 RX ORDER — ONDANSETRON 4 MG/1
TABLET, ORALLY DISINTEGRATING ORAL
Qty: 10 TABLET | Refills: 0 | Status: SHIPPED | OUTPATIENT
Start: 2024-07-03

## 2024-07-03 NOTE — PROGRESS NOTES
"Subjective:      Patient ID: Jeremi Haney is a 23 y.o. female.    Vitals:  height is 5' 3" (1.6 m) and weight is 60.8 kg (134 lb). Her oral temperature is 98.4 °F (36.9 °C). Her blood pressure is 105/75 and her pulse is 107. Her respiration is 18 and oxygen saturation is 98%.     Chief Complaint: Cough    This is a 23 y.o. female who presents today with a chief complaint of cough started on Friday. States that she feels similar to when she's had covid 19 in the past.  Would also like a UPT for nausea.  No vomiting.  Hx of covid 19 five times self reported and notes that she's always treated with otc.    Cough  This is a new problem. The current episode started in the past 7 days. Associated symptoms include chills, a fever, headaches, myalgias, nasal congestion and rhinorrhea. Pertinent negatives include no chest pain, ear congestion, ear pain, heartburn, hemoptysis, postnasal drip, rash, sore throat, shortness of breath, sweats, weight loss or wheezing. Associated symptoms comments: Vomiting , nausea . Nothing aggravates the symptoms. Treatments tried: aleve, There is no history of asthma, bronchiectasis, bronchitis, COPD, emphysema, environmental allergies or pneumonia.       Constitution: Positive for chills, fatigue and fever.   HENT:  Negative for ear pain, postnasal drip and sore throat.    Cardiovascular:  Negative for chest pain.   Respiratory:  Positive for cough. Negative for sputum production, bloody sputum, shortness of breath and wheezing.    Gastrointestinal:  Positive for nausea. Negative for abdominal pain, vomiting, diarrhea and heartburn.   Musculoskeletal:  Positive for muscle ache.   Skin:  Negative for rash.   Allergic/Immunologic: Negative for environmental allergies.   Neurological:  Positive for headaches.      Objective:     Physical Exam   Constitutional: She is oriented to person, place, and time. She appears well-developed. She is cooperative.  Non-toxic appearance. She does not " appear ill. No distress.   HENT:   Head: Normocephalic and atraumatic.   Ears:   Right Ear: Hearing, tympanic membrane, external ear and ear canal normal.   Left Ear: Hearing, tympanic membrane, external ear and ear canal normal.   Nose: Nose normal. No mucosal edema, rhinorrhea or nasal deformity. No epistaxis. Right sinus exhibits no maxillary sinus tenderness and no frontal sinus tenderness. Left sinus exhibits no maxillary sinus tenderness and no frontal sinus tenderness.   Mouth/Throat: Uvula is midline, oropharynx is clear and moist and mucous membranes are normal. No trismus in the jaw. Normal dentition. No uvula swelling. No oropharyngeal exudate, posterior oropharyngeal edema or posterior oropharyngeal erythema.   Eyes: Conjunctivae and lids are normal. No scleral icterus.   Neck: Trachea normal and phonation normal. Neck supple. No edema present. No erythema present. No neck rigidity present.   Cardiovascular: Normal rate, regular rhythm, normal heart sounds and normal pulses.   Pulmonary/Chest: Effort normal and breath sounds normal. No respiratory distress. She has no decreased breath sounds. She has no rhonchi.   Abdominal: Normal appearance.   Musculoskeletal: Normal range of motion.         General: No deformity. Normal range of motion.   Neurological: She is alert and oriented to person, place, and time. She exhibits normal muscle tone. Coordination normal.   Skin: Skin is warm, dry, intact, not diaphoretic and not pale.   Psychiatric: Her speech is normal and behavior is normal. Judgment and thought content normal.   Nursing note and vitals reviewed.      Assessment:     1. COVID-19 virus infection    2. Cough, unspecified type    3. Nausea        Plan:     Results for orders placed or performed in visit on 07/03/24   SARS Coronavirus 2 Antigen, POCT Manual Read   Result Value Ref Range    SARS Coronavirus 2 Antigen Positive (A) Negative     Acceptable Yes    POCT urine pregnancy    Result Value Ref Range    POC Preg Test, Ur Negative Negative     Acceptable Yes          COVID-19 virus infection    Cough, unspecified type  -     SARS Coronavirus 2 Antigen, POCT Manual Read    Nausea  -     POCT urine pregnancy  -     ondansetron (ZOFRAN-ODT) 4 MG TbDL; One tablet sublingual tid prn nausea  Dispense: 10 tablet; Refill: 0      Patient Instructions   Zofran as needed for nausea or vomiting.  Electrolyte solution such as Pedialyte or Body [place] Lyte.  Please drink plenty of fluids.  Please get plenty of rest.  Please return here or go to the Emergency Department for any concerns or worsening of condition.  If you do not have Hypertension or any history of palpitations, it is ok to take over the counter Sudafed or Mucinex D or Allegra-D or Claritin-D or Zyrtec-D.  If you do take one of the above, it is ok to combine that with plain over the counter Mucinex or Allegra or Claritin or Zyrtec.  If for example you are taking Zyrtec -D, you can combine that with Mucinex, but not Mucinex-D.  If you are taking Mucinex-D, you can combine that with plain Allegra or Claritin or Zyrtec.   If you do have Hypertension or palpitations, it is safe to take Coricidin HBP for relief of sinus symptoms.  If not allergic, please take over the counter Tylenol (Acetaminophen) and/or Motrin (Ibuprofen) as directed for control of pain and/or fever.  Please follow up with your primary care doctor or specialist as needed.    If you  smoke, please stop smoking.

## 2024-07-03 NOTE — PATIENT INSTRUCTIONS
Zofran as needed for nausea or vomiting.  Electrolyte solution such as Pedialyte or Body [place] Lyte.  Please drink plenty of fluids.  Please get plenty of rest.  Please return here or go to the Emergency Department for any concerns or worsening of condition.  If you do not have Hypertension or any history of palpitations, it is ok to take over the counter Sudafed or Mucinex D or Allegra-D or Claritin-D or Zyrtec-D.  If you do take one of the above, it is ok to combine that with plain over the counter Mucinex or Allegra or Claritin or Zyrtec.  If for example you are taking Zyrtec -D, you can combine that with Mucinex, but not Mucinex-D.  If you are taking Mucinex-D, you can combine that with plain Allegra or Claritin or Zyrtec.   If you do have Hypertension or palpitations, it is safe to take Coricidin HBP for relief of sinus symptoms.  If not allergic, please take over the counter Tylenol (Acetaminophen) and/or Motrin (Ibuprofen) as directed for control of pain and/or fever.  Please follow up with your primary care doctor or specialist as needed.    If you  smoke, please stop smoking.

## 2024-07-03 NOTE — LETTER
July 3, 2024      [facility] [place]  [address] [place] [address]  Phone: [phone]  Fax: [phone]       Patient: Jeremi Haney   YOB: 2000  Date of Visit: 07/03/2024    To Whom It May Concern:    Rosas Haney  was at [facility] on 07/03/2024. The patient may return to work/school on 7/8/24 with no restrictions. If you have any questions or concerns, or if I can be of further assistance, please do not hesitate to contact me.    Sincerely,            Mica Wilcox, NP